# Patient Record
Sex: MALE | Race: BLACK OR AFRICAN AMERICAN | Employment: UNEMPLOYED | URBAN - METROPOLITAN AREA
[De-identification: names, ages, dates, MRNs, and addresses within clinical notes are randomized per-mention and may not be internally consistent; named-entity substitution may affect disease eponyms.]

---

## 2022-04-27 ENCOUNTER — OFFICE VISIT (OUTPATIENT)
Dept: PRIMARY CARE CLINIC | Age: 62
End: 2022-04-27
Payer: MEDICAID

## 2022-04-27 VITALS
BODY MASS INDEX: 21.82 KG/M2 | OXYGEN SATURATION: 98 % | WEIGHT: 139 LBS | DIASTOLIC BLOOD PRESSURE: 83 MMHG | SYSTOLIC BLOOD PRESSURE: 119 MMHG | HEIGHT: 67 IN | TEMPERATURE: 96.6 F | HEART RATE: 69 BPM

## 2022-04-27 DIAGNOSIS — M25.562 PAIN IN LATERAL PORTION OF LEFT KNEE: Primary | ICD-10-CM

## 2022-04-27 DIAGNOSIS — M54.32 LEFT SIDED SCIATICA: ICD-10-CM

## 2022-04-27 DIAGNOSIS — M19.131 POSTTRAUMATIC ARTHRITIS OF WRIST, RIGHT: ICD-10-CM

## 2022-04-27 DIAGNOSIS — Z00.00 ROUTINE ADULT HEALTH MAINTENANCE: ICD-10-CM

## 2022-04-27 DIAGNOSIS — R20.2 PARESTHESIA OF ARM: ICD-10-CM

## 2022-04-27 DIAGNOSIS — M19.132 POST-TRAUMATIC ARTHRITIS OF LEFT WRIST: ICD-10-CM

## 2022-04-27 PROCEDURE — 99203 OFFICE O/P NEW LOW 30 MIN: CPT | Performed by: INTERNAL MEDICINE

## 2022-04-27 SDOH — ECONOMIC STABILITY: FOOD INSECURITY: WITHIN THE PAST 12 MONTHS, YOU WORRIED THAT YOUR FOOD WOULD RUN OUT BEFORE YOU GOT MONEY TO BUY MORE.: NEVER TRUE

## 2022-04-27 SDOH — ECONOMIC STABILITY: FOOD INSECURITY: WITHIN THE PAST 12 MONTHS, THE FOOD YOU BOUGHT JUST DIDN'T LAST AND YOU DIDN'T HAVE MONEY TO GET MORE.: NEVER TRUE

## 2022-04-27 ASSESSMENT — PATIENT HEALTH QUESTIONNAIRE - PHQ9
SUM OF ALL RESPONSES TO PHQ9 QUESTIONS 1 & 2: 0
SUM OF ALL RESPONSES TO PHQ QUESTIONS 1-9: 0
SUM OF ALL RESPONSES TO PHQ QUESTIONS 1-9: 0
2. FEELING DOWN, DEPRESSED OR HOPELESS: 0
1. LITTLE INTEREST OR PLEASURE IN DOING THINGS: 0
SUM OF ALL RESPONSES TO PHQ QUESTIONS 1-9: 0
SUM OF ALL RESPONSES TO PHQ QUESTIONS 1-9: 0

## 2022-04-27 ASSESSMENT — SOCIAL DETERMINANTS OF HEALTH (SDOH): HOW HARD IS IT FOR YOU TO PAY FOR THE VERY BASICS LIKE FOOD, HOUSING, MEDICAL CARE, AND HEATING?: NOT HARD AT ALL

## 2022-04-27 NOTE — PATIENT INSTRUCTIONS
1. X rays of both wrists, neck, and left knee  2. Fasting blood work  3. Overdue for vaccinations, colonoscopy  4. Referrals to [facility] K[facility] and Ortho Spine.   5. Will probably need disability evaluation, but lets see what can be fixed first

## 2022-04-27 NOTE — PROGRESS NOTES
2022    Rashmi Villar (:  1960) is a 64 y.o. male, here for evaluation of the following medical concerns:    Chief Complaint   Patient presents with    New Patient       HPI  71-year-old healthy without records in care everywhere comes in seeking disability for multiple remote work-related injuries with seemingly progressive arthritis involving his neck low back left worse than right hands and wrists, left knee. Mr. Ollie Moy explains that he suffered a work-related injury where he broke both his arms and left leg in , suffering a fall while working in [place]. He indicates he did not have surgery but rather splinting, does not believe he has any hardware in his body. He evidently signed a release of liability as part of an out of court settlement. That though he did not have he suffered another fall apparently also on the job though is unsure when, that resulted in his low back pain. He also endorses pain radiating from his neck down to his left hand. He has been unable to work due to progressive weakness and joint pain, for the last couple of years. He evidently was hospitalized at [facility] for these orthopedic injuries, I am unable to review any orthopedic notes in the chart. He tells me that he was told that he would eventually need knee surgery, and he endorses knee pain give way weakness. He takes a large number of NSAIDs a day. He has 3 or 4 alcoholic drinks a month. He alternates between smoking a pack of cigarettes a day and a couple of cigars a day. He smokes a fair amount of marijuana couple grams a day. He does not recall having had health screening labs ever, no colonoscopy. No immunizations on record. Review of Systems   Constitutional: Negative for activity change, appetite change, fatigue and unexpected weight change. HENT: Negative for dental problem, sinus pain, sore throat and trouble swallowing.     Eyes: Negative for pain and visual disturbance. Respiratory: Negative for apnea, cough, chest tightness, shortness of breath and wheezing. Cardiovascular: Negative for chest pain and palpitations. Gastrointestinal: Negative for abdominal pain, blood in stool, constipation, diarrhea, nausea, rectal pain and vomiting. Endocrine: Negative for cold intolerance, heat intolerance, polydipsia, polyphagia and polyuria. Genitourinary: Negative for difficulty urinating, dysuria, flank pain, frequency, hematuria, pelvic pain, urgency. Musculoskeletal: Positive for arthralgias, back pain, gait problem, joint swelling, myalgias, neck pain; negative for joint swelling myalgias neck stiffness. Skin: Negative for color change and rash. Neurological: Negative for dizziness, tremors, syncope, speech difficulty, weakness, light-headedness and headaches. His left index finger has been numb since 2017. Hematological: Negative for adenopathy. Does not bruise/bleed easily. Psychiatric/Behavioral: Negative for agitation, behavioral problems, decreased concentration, sleep disturbance and suicidal ideas. The patient is not nervous/anxious and is not hyperactive. Prior to Visit Medications    Not on File        Allergies   Allergen Reactions    Penicillins        No past medical history on file. No past surgical history on file.     Social History     Socioeconomic History    Marital status:      Spouse name: Not on file    Number of children: Not on file    Years of education: Not on file    Highest education level: Not on file   Occupational History    Not on file   Tobacco Use    Smoking status: Current Every Day Smoker     Types: Cigarettes, Cigars    Smokeless tobacco: Never Used   Substance and Sexual Activity    Alcohol use: Not on file    Drug use: Not on file    Sexual activity: Not on file   Other Topics Concern    Not on file   Social History Narrative    Not on file     Social Determinants of Health     Financial Resource Strain: Low Risk     Difficulty of Paying Living Expenses: Not hard at all   Food Insecurity: No Food Insecurity    Worried About Running Out of Food in the Last Year: Never true    Latesha of Food in the Last Year: Never true   Transportation Needs:     Lack of Transportation (Medical): Not on file    Lack of Transportation (Non-Medical): Not on file   Physical Activity:     Days of Exercise per Week: Not on file    Minutes of Exercise per Session: Not on file   Stress:     Feeling of Stress : Not on file   Social Connections:     Frequency of Communication with Friends and Family: Not on file    Frequency of Social Gatherings with Friends and Family: Not on file    Attends Yazidism Services: Not on file    Active Member of [address] [place] [address] [employer] or Organizations: Not on file    Attends Club or Organization Meetings: Not on file    Marital Status: Not on file   Intimate Partner Violence:     Fear of Current or Ex-Partner: Not on file    Emotionally Abused: Not on file    Physically Abused: Not on file    Sexually Abused: Not on file   Housing Stability:     Unable to Pay for Housing in the Last Year: Not on file    Number of [place] in the Last Year: Not on file    Unstable Housing in the Last Year: Not on file        No family history on file. Vitals:    04/27/22 1443   BP: 119/83   Pulse: 69   Temp: 96.6 °F (35.9 °C)   TempSrc: Temporal   SpO2: 98%   Weight: 139 lb (63 kg)   Height: 5' 7\" (1.702 m)     Estimated body mass index is 21.77 kg/m² as calculated from the following:    Height as of this encounter: 5' 7\" (1.702 m). Weight as of this encounter: 139 lb (63 kg). PHYSICAL EXAM  GENERAL:  Pleasant  male who looks older than his stated age, awake alert and oriented x3, no acute distress. EXTREMITIES:  Warm and well perfused without clubbing cyanosis or edema. 2+ pulses in all 4 extremities. Capillary refill less than 2 seconds.   NEURO:  Cranial nerves 2-12 grossly intact. Normal muscle bulk and tone. No resting tremor, cogwheeling, normal rapid alternating movements in the hands and feet. No stocking paresthesia. Normal gait and station. Positive Tinel sign on the left  MUSCULOSKELETAL: Extensive osteoarthritic changes. PSYCH:  No psychomotor retardation or agitation. Good eye contact. Unrestricted affect range. Mood congruent with affect. Linear thought. LABS  Lab Review   No visits with results within 14 Month(s) from this visit. Latest known visit with results is:   No results found for any previous visit. ASSESSMENT/PLAN  72-year-old male with extensive work-related injuries with chronic sequelae from such, some suggestion not thoroughly examined today of neural impingement in the neck possibly back and carpal tunnel, without recent evaluation or routine health care. We agreed our goals would be to initiate this evaluation to determine the extent to which some of his physical limitations are reversible, with the expectation that he would need disability evaluation to address any remnant issues. It is my hope that his spine and knee issues can be addressed in a single orthopedic visit though this may be a challenge. 1. Pain in lateral portion of left knee    - Jazzy Edwards MD, Orthopedic Surgery, [facility]  - C-Reactive Protein; Future  - Uric Acid; Future  - RHEUMATOID FACTOR; Future  - ADAN Reflex to Antibody Cascade; Future  - Vitamin B12; Future  - XR KNEE LEFT (1-2 VIEWS); Future    2. Paresthesia of arm  May need EMG to sort out cervical radiculopathy versus more peripheral impingement syndromes. - Andres Gilbert MD, Orthopedic Surgery, [facility], Ashlyn Mulligan MD, Orthopedic Surgery, [facility]  - C-Reactive Protein; Future  - Uric Acid; Future  - RHEUMATOID FACTOR; Future  - ADAN Reflex to Antibody Cascade; Future  - Vitamin B12; Future  - XR KNEE LEFT (1-2 VIEWS);  Future  - XR CERVICAL SPINE (2-3 VIEWS); Future    3. Post-traumatic arthritis of left wrist    - Jazzy - Juan Washington MD, Orthopedic Surgery, [facility]  - XR KNEE LEFT (1-2 VIEWS); Future  - XR WRIST RIGHT (2 VIEWS); Future    4. Routine adult health maintenance  No vaccinations on record. No colonoscopy. Smoker, precontemplative. Marijuana use as well. Indicated that we would discuss lung cancer screening and colonoscopy in the future. - TSH with Reflex; Future  - HIV Screen; Future  - Hep C AB RLFX HCV PCR-A; Future  - Hemoglobin A1C; Future  - PSA, Total and Free; Future  - CBC with Auto Differential; Future  - Comprehensive Metabolic Panel; Future  - Vitamin D 25 Hydroxy; Future    5. Left sided sciatica  EMG, high-level imaging may become necessary.  - Lisbet Delaney MD, Orthopedic Surgery, [facility]  - XR KNEE LEFT (1-2 VIEWS); Future  - XR LUMBAR SPINE (2-3 VIEWS); Future    6. Posttraumatic arthritis of wrist, right    - XR WRIST LEFT (2 VIEWS); Future      Return in about 4 weeks (around 5/25/2022). It was a pleasure to visit with Mr. Angela Ramirez today. Answered all questions as best I could.   Shaina Gu MD   Time 32-minute

## 2022-05-20 ENCOUNTER — APPOINTMENT (OUTPATIENT)
Dept: GENERAL RADIOLOGY | Age: 62
End: 2022-05-20
Payer: MEDICAID

## 2022-05-20 ENCOUNTER — HOSPITAL ENCOUNTER (EMERGENCY)
Age: 62
Discharge: HOME | End: 2022-05-20
Attending: EMERGENCY MEDICINE
Payer: MEDICAID

## 2022-05-20 VITALS
TEMPERATURE: 98.5 F | HEART RATE: 77 BPM | OXYGEN SATURATION: 98 % | SYSTOLIC BLOOD PRESSURE: 134 MMHG | DIASTOLIC BLOOD PRESSURE: 86 MMHG | RESPIRATION RATE: 16 BRPM | BODY MASS INDEX: 21.56 KG/M2 | WEIGHT: 137.4 LBS | HEIGHT: 67 IN

## 2022-05-20 DIAGNOSIS — M16.11 ARTHRITIS OF RIGHT HIP: ICD-10-CM

## 2022-05-20 DIAGNOSIS — M25.551 RIGHT HIP PAIN: Primary | ICD-10-CM

## 2022-05-20 PROCEDURE — 73502 X-RAY EXAM HIP UNI 2-3 VIEWS: CPT

## 2022-05-20 PROCEDURE — 99283 EMERGENCY DEPT VISIT LOW MDM: CPT

## 2022-05-20 PROCEDURE — 6370000000 HC RX 637 (ALT 250 FOR IP): Performed by: EMERGENCY MEDICINE

## 2022-05-20 RX ORDER — IBUPROFEN 600 MG/1
600 TABLET ORAL ONCE
Status: COMPLETED | OUTPATIENT
Start: 2022-05-20 | End: 2022-05-20

## 2022-05-20 RX ORDER — NAPROXEN 500 MG/1
500 TABLET ORAL 2 TIMES DAILY WITH MEALS
Qty: 60 TABLET | Refills: 0 | Status: SHIPPED | OUTPATIENT
Start: 2022-05-20

## 2022-05-20 RX ADMIN — IBUPROFEN 600 MG: 600 TABLET ORAL at 21:55

## 2022-05-20 ASSESSMENT — ENCOUNTER SYMPTOMS
SHORTNESS OF BREATH: 0
COUGH: 0
RESPIRATORY NEGATIVE: 1
GASTROINTESTINAL NEGATIVE: 1
CHEST TIGHTNESS: 0
BACK PAIN: 0
SORE THROAT: 0
ABDOMINAL PAIN: 0
NAUSEA: 0
VOMITING: 0
DIARRHEA: 0

## 2022-05-20 ASSESSMENT — PAIN SCALES - GENERAL
PAINLEVEL_OUTOF10: 8
PAINLEVEL_OUTOF10: 4

## 2022-05-20 ASSESSMENT — PAIN DESCRIPTION - DESCRIPTORS: DESCRIPTORS: SORE

## 2022-05-20 ASSESSMENT — PAIN DESCRIPTION - ORIENTATION: ORIENTATION: RIGHT

## 2022-05-20 ASSESSMENT — PAIN DESCRIPTION - LOCATION: LOCATION: HIP

## 2022-05-20 ASSESSMENT — PAIN DESCRIPTION - PAIN TYPE: TYPE: ACUTE PAIN

## 2022-05-21 NOTE — ED PROVIDER NOTES
[id number] [facility]  [facility]      Pt Name: Anuja Francois  MRN: [id number]  Thiengfeda 1960  Date ofevaluation: 5/20/2022  Provider: Apple Martin MD    CHIEF COMPLAINT       Chief Complaint   Patient presents with    Hip Pain     right hip pain since 5/17. no known injury or trauma. no OTC pain meds. pain now at 4--up to 8 with walking. pt states he is limping a little bit now. HISTORY OF PRESENT ILLNESS   (Location/Symptom, Timing/Onset,Context/Setting, Quality, Duration, Modifying Factors, Severity)  Note limiting factors. Anuja Francois is a 64 y.o. male  who  has no past medical history on file. who presents to the emergency department     19-year-old male presents for right hip pain that has been gradually worsening for the last 3 to 4 days. No known injury or trauma. Has not taken any over-the-counter medications. Pain is worse with walking or bending. Better with immobilization or lying down. No other known risk factors. No chronic past medical history. No recent falls. No numbness or weakness. No other associated symptoms. No obvious inciting factors. Pain is aching and dull in nature nonradiating. For further review of systems see below. The history is provided by the patient. No  was used. NursingNotes were reviewed. REVIEW OF SYSTEMS    (2-9 systems for level 4, 10 or more for level 5)     Review of Systems   Constitutional: Negative. Negative for chills and fever. HENT: Negative for congestion and sore throat. Respiratory: Negative. Negative for cough, chest tightness and shortness of breath. Cardiovascular: Negative. Negative for chest pain. Gastrointestinal: Negative. Negative for abdominal pain, diarrhea, nausea and vomiting. Genitourinary: Negative. Musculoskeletal: Positive for arthralgias. Negative for back pain and joint swelling. Skin: Negative.   Negative for wound.   Neurological: Negative. Negative for dizziness, weakness, light-headedness, numbness and headaches. Hematological: Negative. Does not bruise/bleed easily. All other systems reviewed and are negative. Except as noted above the remainder of the review of systems was reviewed and negative. PAST MEDICAL HISTORY   History reviewed. No pertinent past medical history. SURGICALHISTORY     History reviewed. No pertinent surgical history. CURRENT MEDICATIONS       Previous Medications    No medications on file            Penicillins    FAMILY HISTORY     History reviewed. No pertinent family history. SOCIAL HISTORY       Social History     Socioeconomic History    Marital status:      Spouse name: None    Number of children: None    Years of education: None    Highest education level: None   Occupational History    None   Tobacco Use    Smoking status: Current Every Day Smoker     Packs/day: 0.50     Types: Cigarettes, Cigars    Smokeless tobacco: Never Used   Substance and Sexual Activity    Alcohol use: Yes     Comment: few times a year    Drug use: Yes     Types: Marijuana Norval Remak)     Comment: 5/20/22 marijuana    Sexual activity: None   Other Topics Concern    None   Social History Narrative    None     Social Determinants of Health     Financial Resource Strain: Low Risk     Difficulty of Paying Living Expenses: Not hard at all   Food Insecurity: No Food Insecurity    Worried About Running Out of Food in the Last Year: Never true    Latesha of Food in the Last Year: Never true   Transportation Needs:     Lack of Transportation (Medical): Not on file    Lack of Transportation (Non-Medical):  Not on file   Physical Activity:     Days of Exercise per Week: Not on file    Minutes of Exercise per Session: Not on file   Stress:     Feeling of Stress : Not on file   Social Connections:     Frequency of Communication with Friends and Family: Not on file    Frequency of Social Gatherings with Friends and Family: Not on file    Attends Amish Services: Not on file    Active Member of Clubs or Organizations: Not on file    Attends Club or Organization Meetings: Not on file    Marital Status: Not on file   Intimate Partner Violence:     Fear of Current or Ex-Partner: Not on file    Emotionally Abused: Not on file    Physically Abused: Not on file    Sexually Abused: Not on file   Housing Stability:     Unable to Pay for Housing in the Last Year: Not on file    Number of [place] in the Last Year: Not on file    Unstable Housing in the Last Year: Not on file       SCREENINGS    Anthony Coma Scale  Eye Opening: Spontaneous  Best Verbal Response: Oriented  Best Motor Response: Obeys commands  Anthony Coma Scale Score: 15        PHYSICAL EXAM    (up to 7 for level 4, 8 or more for level 5)     ED Triage Vitals   BP Temp Temp src Pulse Resp SpO2 Height Weight   -- -- -- -- -- -- -- --       Physical Exam  Vitals and nursing note reviewed. Constitutional:       General: He is not in acute distress. Appearance: Normal appearance. He is well-developed and normal weight. He is not ill-appearing, toxic-appearing or diaphoretic. HENT:      Head: Normocephalic and atraumatic. Mouth/Throat:      Mouth: Mucous membranes are moist.      Pharynx: Oropharynx is clear. Eyes:      Extraocular Movements: Extraocular movements intact. Cardiovascular:      Rate and Rhythm: Normal rate and regular rhythm. Pulses: Normal pulses. Pulmonary:      Effort: Pulmonary effort is normal.      Breath sounds: Normal breath sounds. No decreased breath sounds. Abdominal:      Palpations: Abdomen is soft. Tenderness: There is no abdominal tenderness. Musculoskeletal:      Cervical back: Normal range of motion and neck supple. Right hip: Tenderness present. No deformity, lacerations or bony tenderness. Normal range of motion.       Left hip: Normal. Right upper leg: Normal. No swelling. Left upper leg: Normal. No swelling. Right knee: No swelling, deformity, effusion, erythema, ecchymosis, lacerations or bony tenderness. Normal range of motion. No tenderness. Left knee: No swelling, deformity, effusion, erythema, ecchymosis, lacerations or bony tenderness. Normal range of motion. No tenderness. Right lower leg: No swelling, deformity, tenderness or bony tenderness. No edema. Left lower leg: No swelling, deformity, tenderness or bony tenderness. No edema. Right ankle: No swelling, deformity or lacerations. No tenderness. Normal range of motion. Normal pulse. Left ankle: No swelling, deformity or lacerations. No tenderness. Normal range of motion. Normal pulse. Right foot: Normal range of motion and normal capillary refill. No swelling, deformity, tenderness or bony tenderness. Normal pulse. Left foot: Normal range of motion and normal capillary refill. No swelling, deformity, tenderness or bony tenderness. Normal pulse. Comments: Mild tenderness with movement of the right hip with both flexion extension internal and external rotation with normal range of motion no obvious deformities    DP and PT pulses 2+ bilaterally   Skin:     General: Skin is warm and dry. Capillary Refill: Capillary refill takes less than 2 seconds. Neurological:      General: No focal deficit present. Mental Status: He is alert. Psychiatric:         Mood and Affect: Mood normal.         RESULTS       RADIOLOGY:   Non-plain filmimages such as CT, Ultrasound and MRI are read by the radiologist.  Interpretation per the Radiologist below, if available at the time ofthis note:    XR HIP 2-3 VW W PELVIS RIGHT   Final Result   No acute abnormality. Degenerative changes to both hips and both SI joints.                ED BEDSIDE ULTRASOUND:   Performed by ED Physician - none    LABS:  Labs Reviewed - No data to display    All other labs were within normal range or not returned as of this dictation. EMERGENCY DEPARTMENT COURSE and DIFFERENTIAL DIAGNOSIS/MDM:   Vitals:    Vitals:    05/20/22 2043   BP: 134/86   Pulse: 77   Resp: 16   Temp: 98.5 °F (36.9 °C)   TempSrc: Oral   SpO2: 98%   Weight: 137 lb 6.4 oz (62.3 kg)   Height: 5' 7\" (1.702 m)       Patient was given thefollowing medications:  Medications   ibuprofen (ADVIL;MOTRIN) tablet 600 mg (600 mg Oral Given 5/20/22 2155)       ED COURSE & MEDICAL DECISION MAKING    Pertinent Labs & Imaging studies reviewed. (See chart for details)   -  Patient seen and evaluated in the emergency department. -  Triage and nursing notes reviewed and incorporated. -  Old chart records reviewed and incorporated. -  Differential diagnosis includes: Differential diagnosis: Osteoarthritis, fracture, dislocation, grade 3 sprain, syndesmotic sprain, vascular injury, neurologic injury, tendon injury, other    59-year-old male presents for atraumatic right hip pain. Neurovascularly intact. No signs of vascular neurologic or tendon injury. No wounds. No overlying erythema or concern for infection. Exam is relatively unremarkable no swelling no deformity. Patient has not been taking any over-the-counter medication. Most likely osteo-arthritis. Low concern for rheumatologic diseases patient has only 1 affected joint. Will give patient ibuprofen as well as prescription for naproxen and instructions to follow-up with primary care provider. X-ray unremarkable      -  Work-up included:  See above  -  ED treatment included: See above  -  Results discussed with patient. REASSESSMENT      I estimate there is LOW risk for COMPARTMENT SYNDROME, DEEP VENOUS THROMBOSIS, SEPTIC ARTHRITIS, TENDON OR NEUROVASCULAR INJURY, thus I consider the discharge disposition reasonable. The patient is at low risk for mortality based on demographic, history and clinical factors.  Given the best available information and clinical assessment, I estimate the risk of hospitalization to be greater than risk of treatment at home. I have explained to the patient that the risk could rapidly change, given precautions for return and instructions. Explained to patient that the risk for mortality is low based on demographic, history and clinical factors. I discussed with patient the results of evaluation in the ED, diagnosis, care, and prognosis. The plan is to discharge to home. Patient is in agreement with plan and questions have been answered. I also discussed with patient the reasons which may require a return visit and the importance of follow-up care. The patient is well-appearing, nontoxic, and improved at the time of discharge. Patient agrees to call to arrange follow-up care as directed. Patient understands to return immediately for worsening/change in symptoms. CRITICAL CARE TIME   Total Critical Care time was 0 minutes, excluding separately reportable procedures. There was a high probability of clinically significant/life threatening deterioration in the patient's condition which required my urgent intervention. CONSULTS:  None    PROCEDURES:  Unless otherwise noted below, none     Procedures    FINAL IMPRESSION      1. Right hip pain    2.  Arthritis of right hip          DISPOSITION/PLAN   DISPOSITION        PATIENT REFERREDTO:  Jazlyn Street MD  [address] [phone]    Schedule an appointment as soon as possible for a visit         DISCHARGEMEDICATIONS:  New Prescriptions    NAPROXEN (NAPROSYN) 500 MG TABLET    Take 1 tablet by mouth 2 times daily (with meals)          (Please note that portions of this note were completed with a voice recognition program.  Efforts were made to edit the dictations but occasionally words are mis-transcribed.)    Abigail Finley MD (electronically signed)  Attending Emergency Physician         Abigail Finley MD  05/20/22 3251

## 2022-05-21 NOTE — ED NOTES
right hip pain since 5/17. no known injury or trauma. no OTC pain meds. pain now at 4--up to 8 with walking. pt states he is limping a little bit now.      Holly Thapa RN  05/20/22 8106

## 2022-06-10 NOTE — ED NOTES
Ready to go home. Discharge instructions with pt. Explained rx and diagnoses. Pain right hip at 4. Limps mildly when walking. Encouraged follow up with PCP.      Sarahi Young RN  06/10/22 [address], RN  06/10/22 5031

## 2022-09-14 ENCOUNTER — TELEPHONE (OUTPATIENT)
Dept: PRIMARY CARE CLINIC | Age: 62
End: 2022-09-14

## 2022-09-14 NOTE — TELEPHONE ENCOUNTER
----- Message from Cara Sanchez sent at 9/14/2022 12:51 PM EDT -----  Subject: Message to Provider    QUESTIONS  Information for Provider? pt requesting that his lab order paperwork be   mailed to his house. [address], [facility], [address]  ---------------------------------------------------------------------------  --------------  Corin Osman INFO  [phone]; OK to leave message on voicemail  ---------------------------------------------------------------------------  --------------  SCRIPT ANSWERS  Relationship to Patient?  Self

## 2023-10-28 ENCOUNTER — HOSPITAL ENCOUNTER (EMERGENCY)
Age: 63
Discharge: HOME | End: 2023-10-29
Attending: EMERGENCY MEDICINE
Payer: MEDICAID

## 2023-10-28 DIAGNOSIS — S01.112A LACERATION OF LEFT EYEBROW, INITIAL ENCOUNTER: Primary | ICD-10-CM

## 2023-10-28 PROCEDURE — 12013 RPR F/E/E/N/L/M 2.6-5.0 CM: CPT

## 2023-10-28 PROCEDURE — 90715 TDAP VACCINE 7 YRS/> IM: CPT | Performed by: EMERGENCY MEDICINE

## 2023-10-28 PROCEDURE — 90471 IMMUNIZATION ADMIN: CPT | Performed by: EMERGENCY MEDICINE

## 2023-10-28 PROCEDURE — 6370000000 HC RX 637 (ALT 250 FOR IP): Performed by: EMERGENCY MEDICINE

## 2023-10-28 PROCEDURE — 99284 EMERGENCY DEPT VISIT MOD MDM: CPT

## 2023-10-28 PROCEDURE — 6360000002 HC RX W HCPCS: Performed by: EMERGENCY MEDICINE

## 2023-10-28 RX ORDER — IBUPROFEN 600 MG/1
600 TABLET ORAL ONCE
Status: COMPLETED | OUTPATIENT
Start: 2023-10-28 | End: 2023-10-28

## 2023-10-28 RX ORDER — HYDROCODONE BITARTRATE AND ACETAMINOPHEN 5; 325 MG/1; MG/1
1 TABLET ORAL ONCE
Status: COMPLETED | OUTPATIENT
Start: 2023-10-28 | End: 2023-10-28

## 2023-10-28 RX ORDER — TETRACAINE HYDROCHLORIDE 5 MG/ML
1 SOLUTION OPHTHALMIC ONCE
Status: COMPLETED | OUTPATIENT
Start: 2023-10-28 | End: 2023-10-28

## 2023-10-28 RX ORDER — IBUPROFEN 600 MG/1
600 TABLET ORAL EVERY 6 HOURS PRN
Qty: 30 TABLET | Refills: 0 | Status: SHIPPED | OUTPATIENT
Start: 2023-10-28

## 2023-10-28 RX ORDER — HYDROCODONE BITARTRATE AND ACETAMINOPHEN 5; 325 MG/1; MG/1
1 TABLET ORAL EVERY 6 HOURS PRN
Qty: 12 TABLET | Refills: 0 | Status: SHIPPED | OUTPATIENT
Start: 2023-10-28 | End: 2023-10-31

## 2023-10-28 RX ADMIN — TETRACAINE HYDROCHLORIDE 1 DROP: 5 SOLUTION OPHTHALMIC at 23:13

## 2023-10-28 RX ADMIN — FLUORESCEIN SODIUM 1 MG: 1 STRIP OPHTHALMIC at 23:13

## 2023-10-28 RX ADMIN — TETANUS TOXOID, REDUCED DIPHTHERIA TOXOID AND ACELLULAR PERTUSSIS VACCINE, ADSORBED 0.5 ML: 5; 2.5; 8; 8; 2.5 SUSPENSION INTRAMUSCULAR at 23:15

## 2023-10-28 RX ADMIN — HYDROCODONE BITARTRATE AND ACETAMINOPHEN 1 TABLET: 5; 325 TABLET ORAL at 23:27

## 2023-10-28 RX ADMIN — IBUPROFEN 600 MG: 600 TABLET, FILM COATED ORAL at 23:27

## 2023-10-28 ASSESSMENT — PAIN - FUNCTIONAL ASSESSMENT
PAIN_FUNCTIONAL_ASSESSMENT: PREVENTS OR INTERFERES SOME ACTIVE ACTIVITIES AND ADLS
PAIN_FUNCTIONAL_ASSESSMENT: 0-10

## 2023-10-28 ASSESSMENT — PAIN DESCRIPTION - DESCRIPTORS: DESCRIPTORS: DISCOMFORT

## 2023-10-28 ASSESSMENT — PAIN DESCRIPTION - LOCATION: LOCATION: EYE

## 2023-10-28 ASSESSMENT — PAIN DESCRIPTION - FREQUENCY: FREQUENCY: CONTINUOUS

## 2023-10-28 ASSESSMENT — PAIN DESCRIPTION - PAIN TYPE: TYPE: ACUTE PAIN

## 2023-10-28 ASSESSMENT — PAIN SCALES - GENERAL: PAINLEVEL_OUTOF10: 9

## 2023-10-28 ASSESSMENT — PAIN DESCRIPTION - ORIENTATION: ORIENTATION: LEFT

## 2023-10-29 VITALS
SYSTOLIC BLOOD PRESSURE: 155 MMHG | RESPIRATION RATE: 16 BRPM | DIASTOLIC BLOOD PRESSURE: 95 MMHG | OXYGEN SATURATION: 97 % | HEIGHT: 67 IN | TEMPERATURE: 97.8 F | WEIGHT: 139.11 LBS | BODY MASS INDEX: 21.83 KG/M2 | HEART RATE: 78 BPM

## 2023-10-29 ASSESSMENT — ENCOUNTER SYMPTOMS
VOMITING: 0
SHORTNESS OF BREATH: 0
DIARRHEA: 0
EYE REDNESS: 0
CONSTIPATION: 0
BACK PAIN: 0
COUGH: 0
RHINORRHEA: 0
EYE PAIN: 0
ABDOMINAL PAIN: 0
NAUSEA: 0

## 2023-10-29 ASSESSMENT — PAIN - FUNCTIONAL ASSESSMENT: PAIN_FUNCTIONAL_ASSESSMENT: 0-10

## 2023-10-29 ASSESSMENT — PAIN DESCRIPTION - PAIN TYPE: TYPE: ACUTE PAIN

## 2023-10-29 ASSESSMENT — PAIN SCALES - GENERAL: PAINLEVEL_OUTOF10: 4

## 2023-10-29 ASSESSMENT — PAIN DESCRIPTION - DESCRIPTORS: DESCRIPTORS: DISCOMFORT

## 2023-10-29 ASSESSMENT — PAIN DESCRIPTION - ORIENTATION: ORIENTATION: LEFT

## 2023-10-29 ASSESSMENT — PAIN DESCRIPTION - LOCATION: LOCATION: EYE

## 2024-06-11 ENCOUNTER — APPOINTMENT (OUTPATIENT)
Dept: GENERAL RADIOLOGY | Age: 64
End: 2024-06-11
Payer: MEDICAID

## 2024-06-11 ENCOUNTER — HOSPITAL ENCOUNTER (INPATIENT)
Age: 64
LOS: 3 days | Discharge: HOME | End: 2024-06-14
Attending: HOSPITALIST | Admitting: INTERNAL MEDICINE
Payer: MEDICAID

## 2024-06-11 ENCOUNTER — APPOINTMENT (OUTPATIENT)
Dept: CT IMAGING | Age: 64
End: 2024-06-11
Payer: MEDICAID

## 2024-06-11 DIAGNOSIS — R06.02 SHORTNESS OF BREATH: ICD-10-CM

## 2024-06-11 DIAGNOSIS — I50.9 CHF (CONGESTIVE HEART FAILURE) (HCC): ICD-10-CM

## 2024-06-11 DIAGNOSIS — J18.9 PNEUMONIA OF BOTH LOWER LOBES DUE TO INFECTIOUS ORGANISM: Primary | ICD-10-CM

## 2024-06-11 PROBLEM — J96.01 ACUTE RESPIRATORY FAILURE WITH HYPOXEMIA (HCC): Status: ACTIVE | Noted: 2024-06-11

## 2024-06-11 LAB
ALBUMIN SERPL-MCNC: 3.1 G/DL (ref 3.4–5)
ALBUMIN/GLOB SERPL: 0.6 {RATIO} (ref 1.1–2.2)
ALP SERPL-CCNC: 65 U/L (ref 40–129)
ALT SERPL-CCNC: 36 U/L (ref 10–40)
ANION GAP SERPL CALCULATED.3IONS-SCNC: 10 MMOL/L (ref 3–16)
AST SERPL-CCNC: 41 U/L (ref 15–37)
BASE EXCESS BLDV CALC-SCNC: 2.2 MMOL/L
BASOPHILS # BLD: 0 K/UL (ref 0–0.2)
BASOPHILS NFR BLD: 0.5 %
BILIRUB SERPL-MCNC: 0.4 MG/DL (ref 0–1)
BUN SERPL-MCNC: 19 MG/DL (ref 7–20)
CALCIUM SERPL-MCNC: 8.7 MG/DL (ref 8.3–10.6)
CHLORIDE SERPL-SCNC: 107 MMOL/L (ref 99–110)
CO2 BLDV-SCNC: 30 MMOL/L
CO2 SERPL-SCNC: 24 MMOL/L (ref 21–32)
COHGB MFR BLDV: 2.8 %
CREAT SERPL-MCNC: 1 MG/DL (ref 0.8–1.3)
DEPRECATED RDW RBC AUTO: 14.5 % (ref 12.4–15.4)
EKG ATRIAL RATE: 90 BPM
EKG DIAGNOSIS: NORMAL
EKG P AXIS: 93 DEGREES
EKG P-R INTERVAL: 164 MS
EKG Q-T INTERVAL: 360 MS
EKG QRS DURATION: 70 MS
EKG QTC CALCULATION (BAZETT): 440 MS
EKG R AXIS: 58 DEGREES
EKG T AXIS: 94 DEGREES
EKG VENTRICULAR RATE: 90 BPM
EOSINOPHIL # BLD: 0.1 K/UL (ref 0–0.6)
EOSINOPHIL NFR BLD: 1.2 %
GFR SERPLBLD CREATININE-BSD FMLA CKD-EPI: 84 ML/MIN/{1.73_M2}
GLUCOSE SERPL-MCNC: 89 MG/DL (ref 70–99)
HCO3 BLDV-SCNC: 28 MMOL/L (ref 23–29)
HCT VFR BLD AUTO: 31.5 % (ref 40.5–52.5)
HGB BLD-MCNC: 10.7 G/DL (ref 13.5–17.5)
IRON SATN MFR SERPL: 15 % (ref 20–50)
IRON SERPL-MCNC: 27 UG/DL (ref 59–158)
LYMPHOCYTES # BLD: 1.8 K/UL (ref 1–5.1)
LYMPHOCYTES NFR BLD: 21.2 %
MCH RBC QN AUTO: 29.5 PG (ref 26–34)
MCHC RBC AUTO-ENTMCNC: 34.1 G/DL (ref 31–36)
MCV RBC AUTO: 86.7 FL (ref 80–100)
METHGB MFR BLDV: 1 %
MONOCYTES # BLD: 1 K/UL (ref 0–1.3)
MONOCYTES NFR BLD: 12.5 %
NEUTROPHILS # BLD: 5.3 K/UL (ref 1.7–7.7)
NEUTROPHILS NFR BLD: 64.6 %
NT-PROBNP SERPL-MCNC: 3044 PG/ML (ref 0–124)
O2 THERAPY: NORMAL
PCO2 BLDV: 49.6 MMHG (ref 40–50)
PH BLDV: 7.36 [PH] (ref 7.35–7.45)
PLATELET # BLD AUTO: 338 K/UL (ref 135–450)
PMV BLD AUTO: 7.1 FL (ref 5–10.5)
PO2 BLDV: <30 MMHG
POTASSIUM SERPL-SCNC: 5.1 MMOL/L (ref 3.5–5.1)
PROCALCITONIN SERPL IA-MCNC: 0.08 NG/ML (ref 0–0.15)
PROT SERPL-MCNC: 7.9 G/DL (ref 6.4–8.2)
RBC # BLD AUTO: 3.63 M/UL (ref 4.2–5.9)
SAO2 % BLDV: 30 %
SARS-COV-2 RDRP RESP QL NAA+PROBE: NOT DETECTED
SODIUM SERPL-SCNC: 141 MMOL/L (ref 136–145)
TIBC SERPL-MCNC: 182 UG/DL (ref 260–445)
TROPONIN, HIGH SENSITIVITY: 20 NG/L (ref 0–22)
TROPONIN, HIGH SENSITIVITY: 23 NG/L (ref 0–22)
WBC # BLD AUTO: 8.3 K/UL (ref 4–11)

## 2024-06-11 PROCEDURE — 83550 IRON BINDING TEST: CPT

## 2024-06-11 PROCEDURE — 6360000002 HC RX W HCPCS: Performed by: PHYSICIAN ASSISTANT

## 2024-06-11 PROCEDURE — 71045 X-RAY EXAM CHEST 1 VIEW: CPT

## 2024-06-11 PROCEDURE — 6360000002 HC RX W HCPCS: Performed by: HOSPITALIST

## 2024-06-11 PROCEDURE — 2580000003 HC RX 258

## 2024-06-11 PROCEDURE — 84145 PROCALCITONIN (PCT): CPT

## 2024-06-11 PROCEDURE — 2060000000 HC ICU INTERMEDIATE R&B

## 2024-06-11 PROCEDURE — 99223 1ST HOSP IP/OBS HIGH 75: CPT | Performed by: INTERNAL MEDICINE

## 2024-06-11 PROCEDURE — 6370000000 HC RX 637 (ALT 250 FOR IP): Performed by: PHYSICIAN ASSISTANT

## 2024-06-11 PROCEDURE — 87635 SARS-COV-2 COVID-19 AMP PRB: CPT

## 2024-06-11 PROCEDURE — 93005 ELECTROCARDIOGRAM TRACING: CPT | Performed by: EMERGENCY MEDICINE

## 2024-06-11 PROCEDURE — 99285 EMERGENCY DEPT VISIT HI MDM: CPT

## 2024-06-11 PROCEDURE — 87205 SMEAR GRAM STAIN: CPT

## 2024-06-11 PROCEDURE — 96374 THER/PROPH/DIAG INJ IV PUSH: CPT

## 2024-06-11 PROCEDURE — 82803 BLOOD GASES ANY COMBINATION: CPT

## 2024-06-11 PROCEDURE — 87449 NOS EACH ORGANISM AG IA: CPT

## 2024-06-11 PROCEDURE — 2580000003 HC RX 258: Performed by: PHYSICIAN ASSISTANT

## 2024-06-11 PROCEDURE — 2580000003 HC RX 258: Performed by: HOSPITALIST

## 2024-06-11 PROCEDURE — 83540 ASSAY OF IRON: CPT

## 2024-06-11 PROCEDURE — 93010 ELECTROCARDIOGRAM REPORT: CPT | Performed by: INTERNAL MEDICINE

## 2024-06-11 PROCEDURE — 94761 N-INVAS EAR/PLS OXIMETRY MLT: CPT

## 2024-06-11 PROCEDURE — 6370000000 HC RX 637 (ALT 250 FOR IP): Performed by: HOSPITALIST

## 2024-06-11 PROCEDURE — 36415 COLL VENOUS BLD VENIPUNCTURE: CPT

## 2024-06-11 PROCEDURE — 84484 ASSAY OF TROPONIN QUANT: CPT

## 2024-06-11 PROCEDURE — 71260 CT THORAX DX C+: CPT

## 2024-06-11 PROCEDURE — 94640 AIRWAY INHALATION TREATMENT: CPT

## 2024-06-11 PROCEDURE — 2700000000 HC OXYGEN THERAPY PER DAY

## 2024-06-11 PROCEDURE — 83880 ASSAY OF NATRIURETIC PEPTIDE: CPT

## 2024-06-11 PROCEDURE — 87633 RESP VIRUS 12-25 TARGETS: CPT

## 2024-06-11 PROCEDURE — 6360000004 HC RX CONTRAST MEDICATION: Performed by: PHYSICIAN ASSISTANT

## 2024-06-11 PROCEDURE — 87070 CULTURE OTHR SPECIMN AEROBIC: CPT

## 2024-06-11 PROCEDURE — 80053 COMPREHEN METABOLIC PANEL: CPT

## 2024-06-11 PROCEDURE — 85025 COMPLETE CBC W/AUTO DIFF WBC: CPT

## 2024-06-11 RX ORDER — WATER 10 ML/10ML
INJECTION INTRAMUSCULAR; INTRAVENOUS; SUBCUTANEOUS
Status: COMPLETED
Start: 2024-06-11 | End: 2024-06-11

## 2024-06-11 RX ORDER — LOSARTAN POTASSIUM 25 MG/1
25 TABLET ORAL DAILY
Status: DISCONTINUED | OUTPATIENT
Start: 2024-06-11 | End: 2024-06-14

## 2024-06-11 RX ORDER — SODIUM CHLORIDE 0.9 % (FLUSH) 0.9 %
5-40 SYRINGE (ML) INJECTION EVERY 12 HOURS SCHEDULED
Status: DISCONTINUED | OUTPATIENT
Start: 2024-06-11 | End: 2024-06-14 | Stop reason: HOSPADM

## 2024-06-11 RX ORDER — IPRATROPIUM BROMIDE AND ALBUTEROL SULFATE 2.5; .5 MG/3ML; MG/3ML
2 SOLUTION RESPIRATORY (INHALATION) ONCE
Status: COMPLETED | OUTPATIENT
Start: 2024-06-11 | End: 2024-06-11

## 2024-06-11 RX ORDER — METHYLPREDNISOLONE SODIUM SUCCINATE 40 MG/ML
40 INJECTION, POWDER, LYOPHILIZED, FOR SOLUTION INTRAMUSCULAR; INTRAVENOUS EVERY 8 HOURS
Status: DISCONTINUED | OUTPATIENT
Start: 2024-06-11 | End: 2024-06-12

## 2024-06-11 RX ORDER — FUROSEMIDE 10 MG/ML
40 INJECTION INTRAMUSCULAR; INTRAVENOUS 2 TIMES DAILY
Status: DISCONTINUED | OUTPATIENT
Start: 2024-06-11 | End: 2024-06-13

## 2024-06-11 RX ORDER — NICOTINE 21 MG/24HR
1 PATCH, TRANSDERMAL 24 HOURS TRANSDERMAL DAILY
Status: DISCONTINUED | OUTPATIENT
Start: 2024-06-11 | End: 2024-06-14 | Stop reason: HOSPADM

## 2024-06-11 RX ORDER — ACETAMINOPHEN 325 MG/1
650 TABLET ORAL EVERY 6 HOURS PRN
Status: DISCONTINUED | OUTPATIENT
Start: 2024-06-11 | End: 2024-06-14 | Stop reason: HOSPADM

## 2024-06-11 RX ORDER — POTASSIUM CHLORIDE 7.45 MG/ML
10 INJECTION INTRAVENOUS PRN
Status: DISCONTINUED | OUTPATIENT
Start: 2024-06-11 | End: 2024-06-14 | Stop reason: HOSPADM

## 2024-06-11 RX ORDER — HYDRALAZINE HYDROCHLORIDE 20 MG/ML
10 INJECTION INTRAMUSCULAR; INTRAVENOUS EVERY 4 HOURS PRN
Status: DISCONTINUED | OUTPATIENT
Start: 2024-06-11 | End: 2024-06-14 | Stop reason: HOSPADM

## 2024-06-11 RX ORDER — ONDANSETRON 2 MG/ML
4 INJECTION INTRAMUSCULAR; INTRAVENOUS EVERY 6 HOURS PRN
Status: DISCONTINUED | OUTPATIENT
Start: 2024-06-11 | End: 2024-06-14 | Stop reason: HOSPADM

## 2024-06-11 RX ORDER — POTASSIUM CHLORIDE 20 MEQ/1
40 TABLET, EXTENDED RELEASE ORAL PRN
Status: DISCONTINUED | OUTPATIENT
Start: 2024-06-11 | End: 2024-06-14 | Stop reason: HOSPADM

## 2024-06-11 RX ORDER — ONDANSETRON 4 MG/1
4 TABLET, ORALLY DISINTEGRATING ORAL EVERY 8 HOURS PRN
Status: DISCONTINUED | OUTPATIENT
Start: 2024-06-11 | End: 2024-06-14 | Stop reason: HOSPADM

## 2024-06-11 RX ORDER — POLYETHYLENE GLYCOL 3350 17 G/17G
17 POWDER, FOR SOLUTION ORAL DAILY PRN
Status: DISCONTINUED | OUTPATIENT
Start: 2024-06-11 | End: 2024-06-14 | Stop reason: HOSPADM

## 2024-06-11 RX ORDER — SODIUM CHLORIDE 9 MG/ML
INJECTION, SOLUTION INTRAVENOUS PRN
Status: DISCONTINUED | OUTPATIENT
Start: 2024-06-11 | End: 2024-06-14 | Stop reason: HOSPADM

## 2024-06-11 RX ORDER — ACETAMINOPHEN 650 MG/1
650 SUPPOSITORY RECTAL EVERY 6 HOURS PRN
Status: DISCONTINUED | OUTPATIENT
Start: 2024-06-11 | End: 2024-06-14 | Stop reason: HOSPADM

## 2024-06-11 RX ORDER — FUROSEMIDE 10 MG/ML
40 INJECTION INTRAMUSCULAR; INTRAVENOUS ONCE
Status: COMPLETED | OUTPATIENT
Start: 2024-06-11 | End: 2024-06-11

## 2024-06-11 RX ORDER — METHYLPREDNISOLONE SODIUM SUCCINATE 125 MG/2ML
125 INJECTION, POWDER, LYOPHILIZED, FOR SOLUTION INTRAMUSCULAR; INTRAVENOUS ONCE
Status: COMPLETED | OUTPATIENT
Start: 2024-06-11 | End: 2024-06-11

## 2024-06-11 RX ORDER — MAGNESIUM SULFATE IN WATER 40 MG/ML
2000 INJECTION, SOLUTION INTRAVENOUS PRN
Status: DISCONTINUED | OUTPATIENT
Start: 2024-06-11 | End: 2024-06-14 | Stop reason: HOSPADM

## 2024-06-11 RX ORDER — CARVEDILOL 25 MG/1
25 TABLET ORAL 2 TIMES DAILY WITH MEALS
Status: DISCONTINUED | OUTPATIENT
Start: 2024-06-11 | End: 2024-06-14

## 2024-06-11 RX ORDER — IPRATROPIUM BROMIDE AND ALBUTEROL SULFATE 2.5; .5 MG/3ML; MG/3ML
1 SOLUTION RESPIRATORY (INHALATION)
Status: DISCONTINUED | OUTPATIENT
Start: 2024-06-11 | End: 2024-06-12

## 2024-06-11 RX ORDER — SODIUM CHLORIDE 0.9 % (FLUSH) 0.9 %
5-40 SYRINGE (ML) INJECTION PRN
Status: DISCONTINUED | OUTPATIENT
Start: 2024-06-11 | End: 2024-06-14 | Stop reason: HOSPADM

## 2024-06-11 RX ORDER — ENOXAPARIN SODIUM 100 MG/ML
40 INJECTION SUBCUTANEOUS DAILY
Status: DISCONTINUED | OUTPATIENT
Start: 2024-06-11 | End: 2024-06-14 | Stop reason: HOSPADM

## 2024-06-11 RX ADMIN — WATER 1000 MG: 1 INJECTION INTRAMUSCULAR; INTRAVENOUS; SUBCUTANEOUS at 14:50

## 2024-06-11 RX ADMIN — AZITHROMYCIN MONOHYDRATE 500 MG: 500 INJECTION, POWDER, LYOPHILIZED, FOR SOLUTION INTRAVENOUS at 14:51

## 2024-06-11 RX ADMIN — Medication 10 ML: at 20:30

## 2024-06-11 RX ADMIN — FUROSEMIDE 40 MG: 10 INJECTION, SOLUTION INTRAMUSCULAR; INTRAVENOUS at 18:55

## 2024-06-11 RX ADMIN — METHYLPREDNISOLONE SODIUM SUCCINATE 125 MG: 125 INJECTION INTRAMUSCULAR; INTRAVENOUS at 08:44

## 2024-06-11 RX ADMIN — WATER 10 ML: 1 INJECTION INTRAMUSCULAR; INTRAVENOUS; SUBCUTANEOUS at 16:25

## 2024-06-11 RX ADMIN — FUROSEMIDE 40 MG: 10 INJECTION, SOLUTION INTRAMUSCULAR; INTRAVENOUS at 14:44

## 2024-06-11 RX ADMIN — METHYLPREDNISOLONE SODIUM SUCCINATE 40 MG: 40 INJECTION INTRAMUSCULAR; INTRAVENOUS at 16:25

## 2024-06-11 RX ADMIN — IPRATROPIUM BROMIDE AND ALBUTEROL SULFATE 1 DOSE: .5; 3 SOLUTION RESPIRATORY (INHALATION) at 20:30

## 2024-06-11 RX ADMIN — IOPAMIDOL 75 ML: 755 INJECTION, SOLUTION INTRAVENOUS at 10:35

## 2024-06-11 RX ADMIN — IPRATROPIUM BROMIDE AND ALBUTEROL SULFATE 2 DOSE: 2.5; .5 SOLUTION RESPIRATORY (INHALATION) at 08:37

## 2024-06-11 RX ADMIN — LOSARTAN POTASSIUM 25 MG: 25 TABLET, FILM COATED ORAL at 16:25

## 2024-06-11 RX ADMIN — CARVEDILOL 25 MG: 25 TABLET, FILM COATED ORAL at 16:25

## 2024-06-11 ASSESSMENT — LIFESTYLE VARIABLES
HOW OFTEN DO YOU HAVE A DRINK CONTAINING ALCOHOL: NEVER
HOW MANY STANDARD DRINKS CONTAINING ALCOHOL DO YOU HAVE ON A TYPICAL DAY: PATIENT DOES NOT DRINK

## 2024-06-11 ASSESSMENT — PAIN SCALES - GENERAL: PAINLEVEL_OUTOF10: 0

## 2024-06-11 ASSESSMENT — ENCOUNTER SYMPTOMS
COUGH: 0
EYE PAIN: 0
ABDOMINAL PAIN: 0
SORE THROAT: 0
SHORTNESS OF BREATH: 1
NAUSEA: 0
BACK PAIN: 0
VOMITING: 0

## 2024-06-11 NOTE — PROGRESS NOTES
4 Eyes Skin Assessment     NAME:  Tate Adams  YOB: 1960  MEDICAL RECORD NUMBER:  [id number]    The patient is being assessed for  Admission    I agree that at least one RN has performed a thorough Head to Toe Skin Assessment on the patient. ALL assessment sites listed below have been assessed.      Areas assessed by both nurses:    Head, Face, Ears, Shoulders, Back, Chest, Arms, Elbows, Hands, Sacrum. Buttock, Coccyx, Ischium, Legs. Feet and Heels, and Under Medical Devices         Does the Patient have a Wound? No noted wound(s)       Berto Prevention initiated by RN: No  Wound Care Orders initiated by RN: No    Pressure Injury (Stage 3,4, Unstageable, DTI, NWPT, and Complex wounds) if present, place Wound referral order by RN under : No    New Ostomies, if present place, Ostomy referral order under : No     Nurse 1 eSignature: Electronically signed by Kelly Carrasco RN on 6/11/24 at 6:03 PM EDT    **SHARE this note so that the co-signing nurse can place an eSignature**    Nurse 2 eSignature: {Esignature:[id number]}

## 2024-06-11 NOTE — ED PROVIDER NOTES
**ADVANCED PRACTICE PROVIDER, I HAVE EVALUATED THIS PATIENT**        [facility] EMERGENCY DEPARTMENT  EMERGENCY DEPARTMENT ENCOUNTER      Pt Name: Tate Adams  MRN:[id number]  Birthdate 1960  Date of evaluation: 6/11/2024  Provider: Robe Roland PA-C  Note Started: 8:41 AM EDT 6/11/24        Chief Complaint:    Chief Complaint   Patient presents with    Shortness of Breath     Pt states SOB started 2-3 days ago and has ankle swelling bilateral          Nursing Notes, Past Medical Hx, Past Surgical Hx, Social Hx, Allergies, and Family Hx were all reviewed and agreed with or any disagreements were addressed in the HPI.    HPI: (Location, Duration, Timing, Severity, Quality, Assoc Sx, Context, Modifying factors)    History From: Patient  Limitations to history : None    Social Determinants Significantly Affecting Health : None    Chief Complaint of shortness of breath.  Please complaint of shortness of breath for the last 2 days.  Appears to be slightly anxious as well.  He denies fever.  No chest pain, no abdominal pain.  No nausea vomiting.  No history of CHF, no history hypertension or diabetes.  Also complaining of ankle swelling.  He has a slight cough.  No other complaints.  He was given DuoNeb's x 1 by EMS prior to arrival here in the ED.    This is a  63 y.o. male who presents to the emergency room with the above complaint.    PastMedical/Surgical History:  No past medical history on file.  No past surgical history on file.    Medications:  Previous Medications    No medications on file       Review of Systems:  (1 systems needed)  Review of Systems   Constitutional:  Negative for chills and fever.   HENT:  Negative for congestion and sore throat.    Eyes:  Negative for pain and visual disturbance.   Respiratory:  Positive for shortness of breath. Negative for cough.    Cardiovascular:  Negative for chest pain and leg swelling.   Gastrointestinal:  Negative for abdominal pain,  0.08.    Chest x-ray shows diffusely increased interstitial markings throughout the lungs bilaterally which may be due to atypical pneumonia.  Chronic interstitial lung disease cannot be excluded as there are no prior imaging.  Patchy opacity at the medial right lower lung which may reflect airspace disease.  See results above    CT chest with contrast show as above.    Did start patient on Rocephin and Zithromax IV here in the ED.  I discussed his results from today with him.  And discussed admitting him for pneumonia and shortness of breath.  He is okay with this plan.  I will place a consult out to hospitalist service to [facility] for admission.  He has improved after the breathing treatment.    I did talk to Dr. Gonzáles.  And he says he put an order for echo.  And he is okay with starting the patient on Lasix.      Disposition Considerations (Tests not ordered but considered, Shared Decision Making, Pt Expectation of Test or Tx.):     See discussion above.          The patient tolerated their visit well.  I evaluated the patient.  The physician was available for consultation as needed.  The patient and / or the family were informed of the results of any tests, a time was given to answer questions, a plan was proposed and they agreed with plan.     I am the Primary Clinician of Record.     CLINICAL IMPRESSION:  1. Pneumonia of both lower lobes due to infectious organism    2. Shortness of breath        DISPOSITION Decision To Admit 06/11/2024 01:02:03 PM      PATIENT REFERRED TO:  No follow-up provider specified.    DISCHARGE MEDICATIONS:  New Prescriptions    No medications on file       DISCONTINUED MEDICATIONS:  Discontinued Medications    IBUPROFEN (ADVIL;MOTRIN) 600 MG TABLET    Take 1 tablet by mouth every 6 hours as needed for Pain              (Please note the MDM and HPI sections of this note were completed with a voice recognition program.  Efforts were made to edit the dictations but occasionally

## 2024-06-11 NOTE — CARE COORDINATION
Case Management Assessment  Initial Evaluation    Date/Time of Evaluation: 6/11/2024 5:16 PM  Assessment Completed by: TRISTON Mccartney    If patient is discharged prior to next notation, then this note serves as note for discharge by case management.    Patient Name: Tate Adams                   YOB: 1960  Diagnosis: Shortness of breath [R06.02]  Acute respiratory failure with hypoxemia (HCC) [J96.01]  Pneumonia of both lower lobes due to infectious organism [J18.9]                   Date / Time: 6/11/2024  8:14 AM    Patient Admission Status: Inpatient   Readmission Risk (Low < 19, Mod (19-27), High > 27): Readmission Risk Score: 9    Current PCP: No primary care provider on file.  PCP verified by CM?      Chart Reviewed: Yes      History Provided by: Patient  Patient Orientation: Alert and Oriented    Patient Cognition: Alert    Hospitalization in the last 30 days (Readmission):  No    If yes, Readmission Assessment in CM Navigator will be completed.    Advance Directives:      Code Status: Full Code   Patient's Primary Decision Maker is: Legal Next of Kin    Primary Decision Maker: Erin Kline - Brother/Sister - [phone]    Discharge Planning:    Patient lives with: Friends Type of Home: House  Primary Care Giver: Self  Patient Support Systems include: Family Members   Current Financial resources: Medicaid  Current community resources: None  Current services prior to admission: None            Current DME:              Type of Home Care services:  None    ADLS  Prior functional level: Independent in ADLs/IADLs  Current functional level: Independent in ADLs/IADLs    PT AM-PAC:   /24  OT AM-PAC:   /24    Family can provide assistance at DC: Yes  Would you like Case Management to discuss the discharge plan with any other family members/significant others, and if so, who? No  Plans to Return to Present Housing: Yes  Other Identified Issues/Barriers to RETURNING to current housing:

## 2024-06-11 NOTE — ED NOTES
Pt arrived to the ED via [facility] pt states that he has been SOB for 2-3 days with bilateral ankle swelling, +2 pitting edema to both ankles, pt desat to 85% on room air, pt placed on 5 liters of nasal cannula at this time

## 2024-06-11 NOTE — CONSULTS
.REASON FOR CONSULTATION/CC: shortness of breath       Consult at request of No att. providers found for shortness of breath     PCP: No primary care provider on file.  Established Pulmonologist:  None    HISTORY OF PRESENT ILLNESS: Tate Adams is a 63 y.o. year old male with a history of  who presents with     Patient endorses increased shortness of breath cough and phlegm.  This originally started out as orange with phlegm that was associated with abdominal bloating and lower extremity edema.  Patient states this changed over to green phlegm.  Unclear whether the patient had fevers.      This note may have been  transcribed using Dragon Dictation software. Please disregard any translational errors.      Assessment:          Plan:      Hospital Day 0     Abnormal radiograph  Pneumomediastinum  This is demonstrated on the left.  This is a very small.  No intervention needed for this.  Is unlikely to progress.  This is likely secondary to coughing.  Discussed with emergency room physician  Significant emphysema demonstrated with hx tobacco and cocaine use.    Check alpha 1.    Mediastinal hilar adenopathy noted with reticulonodular opacities.    Agree with overall concern for fluid overload versus pneumonia.  Elevated BNP.  Check echocardiogram.  Check procalcitonin today and for the next 3 days.  Agree with empiric antibiotics.    Pulmonary nodules  Seen on CT chest.  Unclear significance.      Left eye laceration  Appears to still have stitches.  Since it appears these were placed in October 2023.  Therefore, these need to be removed.             This note was transcribed using Dragon Dictation software. Please disregard any translational errors.    Thank you for the consult    MISTI MATHIS   [facility]  [phone]             Data:     PAST MEDICAL HISTORY:  No past medical history on file.    PAST SURGICAL HISTORY:  No past surgical history on file.    FAMILY  pneumomediastinum on the left near the  pulmonary hilum.    Lungs/pleura: Secretions in the trachea and right mainstem bronchus.  More  diffuse pattern of peribronchial thickening.  There are trace bilateral  pleural effusions.  Diffuse interlobular septal thickening with  reticulonodular airspace opacities throughout both lungs.  More focal pattern  of peripheral airspace opacification in the right greater than left lower  lobe.    Upper Abdomen: The adrenal glands are not included in the field of view of  this study.    Soft Tissues/Bones: No skeletal abnormalities are appreciated within the  chest.    Impression  1. Extensive pattern of interstitial change including peribronchial  thickening, reticulonodular opacities as well as peripheral airspace  opacities in both lower lobes.  Pattern is nonspecific but may represent an  atypical or viral pattern of pneumonia.  Pulmonary edema may also be  considered.  2. Mediastinal and bilateral hilar lymphadenopathy is likely reactive.  3. Trace pneumomediastinum on the left.  Based upon appearance and location,  this is likely spontaneous relating to altered intrathoracic pressure  dissecting from bronchovascular sheath in the left hilum.      CT Chest w/o contrast: No results found for this or any previous visit.      CTPA: No results found for this or any previous visit.      CXR PA/LAT: No results found for this or any previous visit.      CXR portable: Results for orders placed during the hospital encounter of 06/11/24    XR CHEST PORTABLE    Narrative  EXAMINATION:  ONE XRAY VIEW OF THE CHEST    6/11/2024 8:28 am    COMPARISON:  None.    HISTORY:  ORDERING SYSTEM PROVIDED HISTORY: short  TECHNOLOGIST PROVIDED HISTORY:  Reason for exam:->short  Reason for Exam: Shortness of Breath    FINDINGS:  HEART/MEDIASTINUM: The cardiomediastinal silhouette is within normal limits.    PLEURA/LUNGS: There are diffusely increased interstitial markings throughout  the lungs

## 2024-06-11 NOTE — PROGRESS NOTES
Pharmacy Medication Reconciliation Note     List of medications patient is currently taking is complete.    Source of information:   1. Conversation with patient   2. EMR    Notes regarding home medications:   Patient denies regularly taking any Rx/OTC/herbal home medications. Denies taking any meds today before presenting to the ER.        Angélica Navarro PharmD  6/11/2024 11:32 AM

## 2024-06-11 NOTE — H&P
Hospital Medicine History & Physical      PCP: No primary care provider on file.    Date of Admission: 6/11/2024    Date of Service: Pt seen/examined on 6/11/24 and Admitted to Inpatient with expected LOS greater than two midnights due to medical therapy.     Chief Complaint: Shortness of breath, leg swelling, cough      History Of Present Illness:      63 y.o. male with tobacco use disorder and no known medical history presented emergency room with progressive shortness of breath, leg swelling and productive cough...  The patient started having cough productive of yellow/orange sputum 7 days ago..  Over the past 2 days he is experienced progressive shortness of breath and bilateral ankle swelling..  Shortness of breath is worse with exertion, no orthopnea..  No fevers, chills or rigors.  No chest pain  He smokes 1.5 packs/day, drinks alcohol occasionally, denies illicit drug use..  He does not have a  PCP and is not on any medications  In the ED, BP was 206/100, pulse 84, respirations 36, temperature 98.5  He was hypoxic with oxygen saturation down to 85% on room air and placed on 5 L nasal cannula  BNP was 3000  CT chest showed  1. Extensive pattern of interstitial change including peribronchial  thickening, reticulonodular opacities as well as peripheral airspace  opacities in both lower lobes.  Pattern is nonspecific but may represent an  atypical or viral pattern of pneumonia.  Pulmonary edema may also be  considered.  2. Mediastinal and bilateral hilar lymphadenopathy is likely reactive.  3. Trace pneumomediastinum on the left.  Based upon appearance and location,  this is likely spontaneous relating to altered intrathoracic pressure  dissecting from bronchovascular sheath in the left hilum.           Past Medical History:      No significant medical history    Past Surgical History:      No past surgical history on file.    Medications Prior to Admission:      None    Allergies:  Penicillins    Social  pneumonia, pulmonary edema  -Emphysema/COPD   -Trace pneumomediastinum on the left    -Suspected acute decompensated heart failure..  Small bilateral pleural effusion, suspected pulmonary edema, ankle swelling, elevated BNP  -Suspected pneumonia due to unspecified organism..  Procalcitonin is normal  -COPD/emphysema with possible acute exacerbation  -Tobacco use disorder--smokes 1.5 packs/day  -Hypertensive urgency/emergency..  SBP to 106/100 and presentation  -Normocytic anemia, unknown chronicity    PLAN:  Continue supplemental oxygen  2D echo  IV Lasix, fluid restriction  Empiric antibiotics-IV Rocephin and azithromycin  Continue bronchodilators and Solu-Medrol  Pneumonia panel and sputum cultures  Repeat chest x-ray in a.m. to monitor pneumomediastinum  Pulmonology consulted  Smoking cessation counseling, provide nicotine patches  BP control--continue IV Lasix, add losartan and Coreg    DVT Prophylaxis: Lovenox  Diet: ADULT DIET; Regular; No Added Salt (3-4 gm); 1800 ml  Code Status: Full Code           Orion Perry MD    Thank you No primary care provider on file. for the opportunity to be involved in this patient's care. If you have any questions or concerns please feel free to contact me at [phone].    Comment: Please note this report has been produced using speech recognition software and may contain errors related to that system including errors in grammar, punctuation, and spelling, as well as words and phrases that may be inappropriate. If there are any questions or concerns, please feel free to contact the dictating provider for clarification.

## 2024-06-12 ENCOUNTER — APPOINTMENT (OUTPATIENT)
Dept: GENERAL RADIOLOGY | Age: 64
End: 2024-06-12
Payer: MEDICAID

## 2024-06-12 LAB
ANION GAP SERPL CALCULATED.3IONS-SCNC: 10 MMOL/L (ref 3–16)
BUN SERPL-MCNC: 35 MG/DL (ref 7–20)
CALCIUM SERPL-MCNC: 8.5 MG/DL (ref 8.3–10.6)
CHLORIDE SERPL-SCNC: 109 MMOL/L (ref 99–110)
CHOLEST SERPL-MCNC: 114 MG/DL (ref 0–199)
CO2 SERPL-SCNC: 22 MMOL/L (ref 21–32)
CREAT SERPL-MCNC: 1.5 MG/DL (ref 0.8–1.3)
GFR SERPLBLD CREATININE-BSD FMLA CKD-EPI: 52 ML/MIN/{1.73_M2}
GLUCOSE SERPL-MCNC: 124 MG/DL (ref 70–99)
HDLC SERPL-MCNC: 25 MG/DL (ref 40–60)
LDLC SERPL CALC-MCNC: 71 MG/DL
LEGIONELLA AG UR QL: NORMAL
MAGNESIUM SERPL-MCNC: 2.3 MG/DL (ref 1.8–2.4)
POTASSIUM SERPL-SCNC: 4.8 MMOL/L (ref 3.5–5.1)
PROCALCITONIN SERPL IA-MCNC: 0.18 NG/ML (ref 0–0.15)
REPORT: NORMAL
RESP PATH DNA+RNA PNL L RESP NAA+NON-PRB: NORMAL
S PNEUM AG UR QL: NORMAL
SODIUM SERPL-SCNC: 141 MMOL/L (ref 136–145)
TRIGL SERPL-MCNC: 91 MG/DL (ref 0–150)
TSH SERPL DL<=0.005 MIU/L-ACNC: 0.66 UIU/ML (ref 0.27–4.2)
VLDLC SERPL CALC-MCNC: 18 MG/DL

## 2024-06-12 PROCEDURE — 99233 SBSQ HOSP IP/OBS HIGH 50: CPT | Performed by: INTERNAL MEDICINE

## 2024-06-12 PROCEDURE — 80048 BASIC METABOLIC PNL TOTAL CA: CPT

## 2024-06-12 PROCEDURE — 6370000000 HC RX 637 (ALT 250 FOR IP): Performed by: HOSPITALIST

## 2024-06-12 PROCEDURE — 80061 LIPID PANEL: CPT

## 2024-06-12 PROCEDURE — 84443 ASSAY THYROID STIM HORMONE: CPT

## 2024-06-12 PROCEDURE — 94761 N-INVAS EAR/PLS OXIMETRY MLT: CPT

## 2024-06-12 PROCEDURE — 2060000000 HC ICU INTERMEDIATE R&B

## 2024-06-12 PROCEDURE — 2580000003 HC RX 258

## 2024-06-12 PROCEDURE — 2580000003 HC RX 258: Performed by: HOSPITALIST

## 2024-06-12 PROCEDURE — 82104 ALPHA-1-ANTITRYPSIN PHENO: CPT

## 2024-06-12 PROCEDURE — 84145 PROCALCITONIN (PCT): CPT

## 2024-06-12 PROCEDURE — 82103 ALPHA-1-ANTITRYPSIN TOTAL: CPT

## 2024-06-12 PROCEDURE — 6360000002 HC RX W HCPCS: Performed by: HOSPITALIST

## 2024-06-12 PROCEDURE — 83735 ASSAY OF MAGNESIUM: CPT

## 2024-06-12 PROCEDURE — 94640 AIRWAY INHALATION TREATMENT: CPT

## 2024-06-12 PROCEDURE — 71045 X-RAY EXAM CHEST 1 VIEW: CPT

## 2024-06-12 PROCEDURE — 2700000000 HC OXYGEN THERAPY PER DAY

## 2024-06-12 PROCEDURE — 6370000000 HC RX 637 (ALT 250 FOR IP): Performed by: INTERNAL MEDICINE

## 2024-06-12 PROCEDURE — 36415 COLL VENOUS BLD VENIPUNCTURE: CPT

## 2024-06-12 RX ORDER — PREDNISONE 20 MG/1
20 TABLET ORAL DAILY
Status: DISCONTINUED | OUTPATIENT
Start: 2024-06-12 | End: 2024-06-14

## 2024-06-12 RX ORDER — IPRATROPIUM BROMIDE AND ALBUTEROL SULFATE 2.5; .5 MG/3ML; MG/3ML
1 SOLUTION RESPIRATORY (INHALATION) EVERY 4 HOURS PRN
Status: DISCONTINUED | OUTPATIENT
Start: 2024-06-12 | End: 2024-06-14 | Stop reason: HOSPADM

## 2024-06-12 RX ORDER — WATER 10 ML/10ML
INJECTION INTRAMUSCULAR; INTRAVENOUS; SUBCUTANEOUS
Status: COMPLETED
Start: 2024-06-12 | End: 2024-06-12

## 2024-06-12 RX ADMIN — METHYLPREDNISOLONE SODIUM SUCCINATE 40 MG: 40 INJECTION INTRAMUSCULAR; INTRAVENOUS at 01:25

## 2024-06-12 RX ADMIN — IPRATROPIUM BROMIDE AND ALBUTEROL SULFATE 1 DOSE: .5; 3 SOLUTION RESPIRATORY (INHALATION) at 08:11

## 2024-06-12 RX ADMIN — SODIUM CHLORIDE, PRESERVATIVE FREE 10 ML: 5 INJECTION INTRAVENOUS at 01:25

## 2024-06-12 RX ADMIN — CARVEDILOL 25 MG: 25 TABLET, FILM COATED ORAL at 18:46

## 2024-06-12 RX ADMIN — CARVEDILOL 25 MG: 25 TABLET, FILM COATED ORAL at 09:07

## 2024-06-12 RX ADMIN — FUROSEMIDE 40 MG: 10 INJECTION, SOLUTION INTRAMUSCULAR; INTRAVENOUS at 09:07

## 2024-06-12 RX ADMIN — SODIUM CHLORIDE, PRESERVATIVE FREE 10 ML: 5 INJECTION INTRAVENOUS at 19:54

## 2024-06-12 RX ADMIN — LOSARTAN POTASSIUM 25 MG: 25 TABLET, FILM COATED ORAL at 09:07

## 2024-06-12 RX ADMIN — AZITHROMYCIN MONOHYDRATE 500 MG: 500 INJECTION, POWDER, LYOPHILIZED, FOR SOLUTION INTRAVENOUS at 15:03

## 2024-06-12 RX ADMIN — WATER 1000 MG: 1 INJECTION INTRAMUSCULAR; INTRAVENOUS; SUBCUTANEOUS at 09:07

## 2024-06-12 RX ADMIN — PREDNISONE 20 MG: 20 TABLET ORAL at 09:07

## 2024-06-12 RX ADMIN — WATER 10 ML: 1 INJECTION INTRAMUSCULAR; INTRAVENOUS; SUBCUTANEOUS at 01:30

## 2024-06-12 ASSESSMENT — PAIN SCALES - GENERAL
PAINLEVEL_OUTOF10: 0
PAINLEVEL_OUTOF10: 0

## 2024-06-12 NOTE — PLAN OF CARE
Problem: Discharge Planning  Goal: Discharge to home or other facility with appropriate resources  Outcome: Progressing  Flowsheets  Taken 6/12/2024 0157 by Italia Joseph, RN  Discharge to home or other facility with appropriate resources:   Identify barriers to discharge with patient and caregiver   Arrange for needed discharge resources and transportation as appropriate   Identify discharge learning needs (meds, wound care, etc)   Refer to discharge planning if patient needs post-hospital services based on physician order or complex needs related to functional status, cognitive ability or social support system  Taken 6/11/2024 1543 by Kelly Carrasco RN  Discharge to home or other facility with appropriate resources: Identify barriers to discharge with patient and caregiver     Problem: Pain  Goal: Verbalizes/displays adequate comfort level or baseline comfort level  Outcome: Progressing  Flowsheets (Taken 6/12/2024 0157)  Verbalizes/displays adequate comfort level or baseline comfort level:   Encourage patient to monitor pain and request assistance   Assess pain using appropriate pain scale   Administer analgesics based on type and severity of pain and evaluate response   Implement non-pharmacological measures as appropriate and evaluate response     Problem: Safety - Adult  Goal: Free from fall injury  Outcome: Progressing  Flowsheets (Taken 6/12/2024 0157)  Free From Fall Injury: Instruct family/caregiver on patient safety     Problem: ABCDS Injury Assessment  Goal: Absence of physical injury  Outcome: Progressing  Flowsheets (Taken 6/12/2024 0157)  Absence of Physical Injury: Implement safety measures based on patient assessment

## 2024-06-12 NOTE — PROGRESS NOTES
Component Value Date/Time    CHOL 114 06/12/2024 04:15 AM    HDL 25 06/12/2024 04:15 AM    TRIG 91 06/12/2024 04:15 AM     Hemoglobin A1C: No results found for: \"LABA1C\"  TSH:   Lab Results   Component Value Date/Time    TSH 0.66 06/12/2024 04:15 AM     Troponin: No results found for: \"TROPONINT\"  Lactic Acid: No results for input(s): \"LACTA\" in the last 72 hours.  BNP:   Recent Labs     06/11/24  0832   PROBNP 3,044*     UA:  No results found for: \"NITRU\", \"COLORU\", \"PHUR\", \"LABCAST\", \"WBCUA\", \"RBCUA\", \"MUCUS\", \"TRICHOMONAS\", \"YEAST\", \"BACTERIA\", \"CLARITYU\", \"SPECGRAV\", \"LEUKOCYTESUR\", \"UROBILINOGEN\", \"BILIRUBINUR\", \"BLOODU\", \"GLUCOSEU\", \"KETUA\", \"AMORPHOUS\"  Urine Cultures: No results found for: \"LABURIN\"  Blood Cultures: No results found for: \"BC\"  No results found for: \"BLOODCULT2\"  Organism: No results found for: \"ORG\"      Assessment and Recommendations   Tate Adams is a 63 y.o. male who presents with shortness of breath    -Acute respiratory failure hypoxia-oxygen saturation 85% on room air, required up to 5 L nasal cannula--weaned to room air  -Abnormal imaging CT to  -Bilateral lung opacities..  Possible pneumonia, pulmonary edema  -Emphysema/COPD   -Trace pneumomediastinum on the left     -Suspected acute decompensated heart failure..  Small bilateral pleural effusion, suspected pulmonary edema, ankle swelling, elevated BNP  -Suspected pneumonia due to unspecified organism..  Procalcitonin is normal, pneumonia panel negative  -COPD/emphysema with possible acute exacerbation  -Tobacco use disorder--smokes 1.5 packs/day  -Hypertensive urgency/emergency..  SBP to 106/100 and presentation  -Normocytic anemia, unknown chronicity  -Acute kidney injury-due to hemodynamic changes plus diuretics/ARB     PLAN:    2D echo   Hold IV Lasix and losartan given ARNALDO.  Closely monitor renal function  Empiric antibiotics-IV Rocephin and azithromycin  Continue bronchodilators and steroids  Repeat chest x-ray 6/12 with  no significant changes however pneumomediastinum noted  Pulmonology consult appreciated  Smoking cessation counseling, provide nicotine patches  BP control--continue Coreg..  Holding losartan plus Lasix due to ARNALDO  Closely monitor renal function avoid Keflex medications      Diet ADULT DIET; Regular; No Added Salt (3-4 gm); 1800 ml     DVT Prophylaxis [] Lovenox, []  Heparin, [] SCDs, [] Ambulation,  [] Eliquis, [] Xarelto  [] Coumadin   Code Status Full Code   Disposition From: Home  Expected Disposition: Home  Estimated Date of Discharge:   Patient requires continued admission due to    Surrogate Decision Maker/ POA       Personally reviewed Lab Studies and Imaging        Medical Decision Making:  The following items were considered in medical decision making:  Discussion of patient care with other providers  Reviewed clinical lab tests  Reviewed radiology tests  Reviewed other diagnostic tests/interventions  Independent review of radiologic images  Microbiology cultures and other micro tests reviewed        Electronically signed by Orion Perry MD on 6/12/2024 at 9:34 AM  Comment: Please note this report has been produced using speech recognition software and may contain errors related to that system including errors in grammar, punctuation, and spelling, as well as words and phrases that may be inappropriate. If there are any questions or concerns, please feel free to contact the dictating provider for clarification.

## 2024-06-12 NOTE — DISCHARGE INSTRUCTIONS
Extra Heart Failure Education/ Tools/ Resources:     https://[employer].com/publication/?i=741538   --- this is American Heart Association interactive Healthier Living with Heart Failure guidebook.  Please click hyperlink or copy / paste link into search bar. The QR Code is also available below. Use your mouse to scroll through the pages.  Lots of information about weight monitoring, diet tips, activity, meds, etc    Heart Failure Tools and Resources QR Code is below. It includes multiple resources to include symptom tracker, med tracker, further HF info, and access to a HF Support Network online Community    HF [place] Maria Del Rosario  -- this is a free smart phone maria del rosario available for iPhone and Android download.  Use your phone to track sodium / fluid intake, zone tool symptom tracking, weights, medications, etc. Click on this hyperlink  HF [place] Maria Del Rosario   for QR code for easy download or the link is also found in the below HF Tools and Resources.      DASH (Dietary Approach to Stop Hypertension) diet --  https://www.nhlbi.nih.gov/education/dash-eating-plan -- this diet is a flexible eating plan that promotes heart healthy eating style.  Click on hyperlink or copy / paste link into search bar.  Lots of low sodium recipes and tips.    https://www.[employer]FetchBack/recipes  -- more free recipes

## 2024-06-12 NOTE — PROGRESS NOTES
Pulmonary Progress Note    Date of Admission: 6/11/2024   LOS: 1 day       CC:  Chief Complaint   Patient presents with    Shortness of Breath     Pt states SOB started 2-3 days ago and has ankle swelling bilateral         Subjective:  Feeling much better.   Less wheezing  On and off 1 -2 L NC         Assessment:          Plan:     This note may have been transcribed using Dragon Dictation software. Please disregard any translational errors.       Hospital Day: 1     Abnormal radiograph  Pneumomediastinum  This is demonstrated on the left.  This is a very small.  No intervention needed for this.  Is unlikely to progress.  This is likely secondary to coughing.  No follow up imaging needed.   Significant emphysema demonstrated with hx tobacco and cocaine use.    pending alpha 1.    Duoneb's    Solumedrol - change to prednisone with quick improvement   Mediastinal hilar adenopathy noted with reticulonodular opacities.    Agree with overall concern for fluid overload versus pneumonia.  Elevated BNP.    pending echocardiogram.    Check procalcitonin 0.08 now 0.18.  increase may be secondary to increased Cr. Follow again tomorrow.  Agree with empiric antibiotics.     Pulmonary nodules  Seen on CT chest.  Unclear significance.  Follow up in 3 months.         Left eye laceration               Data:        PHYSICAL EXAM:   Blood pressure (!) 157/94, pulse 71, temperature 97.8 °F (36.6 °C), temperature source Oral, resp. rate 17, height 1.676 m (5' 6\"), weight 70.3 kg (154 lb 15.7 oz), SpO2 96 %.'  Body mass index is 25.01 kg/m².   Gen: No distress.    ENT:   Resp: No accessory muscle use. No crackles. No wheezes. No rhonchi.    CV: Regular rate. Regular rhythm. No murmur or rub. No edema.   Skin: Warm, dry, normal texture and turgor. No nodule on exposed extremities.   M/S: No cyanosis. No clubbing. No joint deformity.  Psych: Oriented x 3. No anxiety.  Awake. Alert. Intact judgement and insight. Good Mood / Affect.  Memory  an  atypical or viral pattern of pneumonia.  Pulmonary edema may also be  considered.  2. Mediastinal and bilateral hilar lymphadenopathy is likely reactive.  3. Trace pneumomediastinum on the left.  Based upon appearance and location,  this is likely spontaneous relating to altered intrathoracic pressure  dissecting from bronchovascular sheath in the left hilum.      CT Chest w/o contrast: No results found for this or any previous visit.      CTPA: No results found for this or any previous visit.      CXR PA/LAT: No results found for this or any previous visit.      CXR portable: Results for orders placed during the hospital encounter of 06/11/24    XR CHEST PORTABLE    Narrative  EXAMINATION:  ONE XRAY VIEW OF THE CHEST    6/12/2024 5:11 am    COMPARISON:  Chest x-ray and chest CT dated 06/11/2024.    HISTORY:  ORDERING SYSTEM PROVIDED HISTORY: pneumomediastinum,chf,  TECHNOLOGIST PROVIDED HISTORY:  Reason for exam:->pneumomediastinum,chf,  Reason for Exam: pneumomediastinum,chf,    FINDINGS:  HEART/MEDIASTINUM: The cardiomediastinal silhouette is unchanged.  Pneumomediastinum is not appreciated on this radiograph.    PLEURA/LUNGS: No change in diffuse increased interstitial markings  bilaterally reflecting pulmonary vascular congestion/interstitial edema  versus an atypical pneumonia.  Trace bilateral pleural effusions..  There is  no appreciable pneumothorax.    BONES/SOFT TISSUE: The visualized osseous structures are unchanged.    Impression  1. Pneumomediastinum is not appreciated on this radiograph.  2. No change in diffuse increased interstitial markings bilaterally  reflecting pulmonary vascular congestion/interstitial edema versus an  atypical pneumonia.             This note was transcribed using Dragon Dictation software. Please disregard any translational errors.      MISTI MATHIS   [facility] Pulmonary, Sleep and Critical Care  [phone]

## 2024-06-12 NOTE — FLOWSHEET NOTE
Oxygen saturation dipping below 90% during sleep for several seconds now. Arouses to name without difficulty. Oxygen at 1 liter applied at this time. Respirations easy. No signs of distress noted.Italia Joseph RN

## 2024-06-12 NOTE — PROGRESS NOTES
Upon assessment, patient has sutures in L eye scar. Patient reports having a cut on eye. Chart review showed laceration 10/28/23 with 10 nylon sutures placed. Patient was instructed to have follow up 1 week after but never completed. [facility] called by this RN, no patient file on record. [facility] doctor returned call and cleared for suture removal and if not done during this admission, patient can be seen outpatient for removal in office.     Electronically signed by Evelyn Joseph RN on 6/12/2024 at 11:15 AM

## 2024-06-13 ENCOUNTER — APPOINTMENT (OUTPATIENT)
Age: 64
End: 2024-06-13
Attending: INTERNAL MEDICINE
Payer: MEDICAID

## 2024-06-13 PROBLEM — I50.41 ACUTE COMBINED SYSTOLIC AND DIASTOLIC HEART FAILURE (HCC): Status: ACTIVE | Noted: 2024-06-13

## 2024-06-13 PROBLEM — J18.9 PNEUMONIA OF BOTH LOWER LOBES DUE TO INFECTIOUS ORGANISM: Status: ACTIVE | Noted: 2024-06-13

## 2024-06-13 LAB
ANION GAP SERPL CALCULATED.3IONS-SCNC: 11 MMOL/L (ref 3–16)
BUN SERPL-MCNC: 30 MG/DL (ref 7–20)
CALCIUM SERPL-MCNC: 8 MG/DL (ref 8.3–10.6)
CHLORIDE SERPL-SCNC: 107 MMOL/L (ref 99–110)
CO2 SERPL-SCNC: 23 MMOL/L (ref 21–32)
CREAT SERPL-MCNC: 1 MG/DL (ref 0.8–1.3)
ECHO AO ASC DIAM: 3.3 CM
ECHO AO ASCENDING AORTA INDEX: 1.84 CM/M2
ECHO AO ROOT DIAM: 3.3 CM
ECHO AO ROOT INDEX: 1.84 CM/M2
ECHO AV AREA PEAK VELOCITY: 2.4 CM2
ECHO AV AREA VTI: 2.6 CM2
ECHO AV AREA/BSA PEAK VELOCITY: 1.3 CM2/M2
ECHO AV AREA/BSA VTI: 1.5 CM2/M2
ECHO AV MEAN GRADIENT: 2 MMHG
ECHO AV MEAN VELOCITY: 0.7 M/S
ECHO AV PEAK GRADIENT: 4 MMHG
ECHO AV PEAK VELOCITY: 1 M/S
ECHO AV VELOCITY RATIO: 0.7
ECHO AV VTI: 21.7 CM
ECHO BSA: 1.8 M2
ECHO EST RA PRESSURE: 15 MMHG
ECHO IVC EXP: 2.5 CM
ECHO IVC INSP: 2 CM
ECHO LA AREA 2C: 26.1 CM2
ECHO LA AREA 4C: 26.1 CM2
ECHO LA MAJOR AXIS: 6 CM
ECHO LA MINOR AXIS: 6.5 CM
ECHO LA VOL BP: 87 ML (ref 18–58)
ECHO LA VOL MOD A2C: 85 ML (ref 18–58)
ECHO LA VOL MOD A4C: 83 ML (ref 18–58)
ECHO LA VOL/BSA BIPLANE: 49 ML/M2 (ref 16–34)
ECHO LA VOLUME INDEX MOD A2C: 47 ML/M2 (ref 16–34)
ECHO LA VOLUME INDEX MOD A4C: 46 ML/M2 (ref 16–34)
ECHO LV E' LATERAL VELOCITY: 5 CM/S
ECHO LV E' SEPTAL VELOCITY: 7 CM/S
ECHO LV FRACTIONAL SHORTENING: 18 % (ref 28–44)
ECHO LV INTERNAL DIMENSION DIASTOLE INDEX: 2.74 CM/M2
ECHO LV INTERNAL DIMENSION DIASTOLIC: 4.9 CM (ref 4.2–5.9)
ECHO LV INTERNAL DIMENSION SYSTOLIC INDEX: 2.23 CM/M2
ECHO LV INTERNAL DIMENSION SYSTOLIC: 4 CM
ECHO LV IVSD: 1.2 CM (ref 0.6–1)
ECHO LV MASS 2D: 213.3 G (ref 88–224)
ECHO LV MASS INDEX 2D: 119.1 G/M2 (ref 49–115)
ECHO LV POSTERIOR WALL DIASTOLIC: 1.1 CM (ref 0.6–1)
ECHO LV RELATIVE WALL THICKNESS RATIO: 0.45
ECHO LVOT AREA: 3.8 CM2
ECHO LVOT AV VTI INDEX: 0.66
ECHO LVOT DIAM: 2.2 CM
ECHO LVOT MEAN GRADIENT: 1 MMHG
ECHO LVOT PEAK GRADIENT: 2 MMHG
ECHO LVOT PEAK VELOCITY: 0.7 M/S
ECHO LVOT STROKE VOLUME INDEX: 30.4 ML/M2
ECHO LVOT SV: 54.3 ML
ECHO LVOT VTI: 14.3 CM
ECHO MV A VELOCITY: 0.85 M/S
ECHO MV E DECELERATION TIME (DT): 201 MS
ECHO MV E VELOCITY: 0.76 M/S
ECHO MV E/A RATIO: 0.89
ECHO MV E/E' LATERAL: 15.2
ECHO MV E/E' RATIO (AVERAGED): 13.03
ECHO MV E/E' SEPTAL: 10.86
ECHO PV MAX VELOCITY: 0.8 M/S
ECHO PV PEAK GRADIENT: 3 MMHG
ECHO RA AREA 4C: 20 CM2
ECHO RA END SYSTOLIC VOLUME APICAL 4 CHAMBER INDEX BSA: 36 ML/M2
ECHO RA VOLUME: 64 ML
ECHO RIGHT VENTRICULAR SYSTOLIC PRESSURE (RVSP): 58 MMHG
ECHO RV BASAL DIMENSION: 3.9 CM
ECHO RV FREE WALL PEAK S': 14 CM/S
ECHO RV LONGITUDINAL DIMENSION: 8.3 CM
ECHO RV MID DIMENSION: 3.1 CM
ECHO RV TAPSE: 3.4 CM (ref 1.7–?)
ECHO TV REGURGITANT MAX VELOCITY: 3.28 M/S
ECHO TV REGURGITANT PEAK GRADIENT: 43 MMHG
GFR SERPLBLD CREATININE-BSD FMLA CKD-EPI: 84 ML/MIN/{1.73_M2}
GLUCOSE SERPL-MCNC: 95 MG/DL (ref 70–99)
MAGNESIUM SERPL-MCNC: 2.1 MG/DL (ref 1.8–2.4)
POTASSIUM SERPL-SCNC: 3.6 MMOL/L (ref 3.5–5.1)
PROCALCITONIN SERPL IA-MCNC: 0.1 NG/ML (ref 0–0.15)
SODIUM SERPL-SCNC: 141 MMOL/L (ref 136–145)

## 2024-06-13 PROCEDURE — 6370000000 HC RX 637 (ALT 250 FOR IP): Performed by: HOSPITALIST

## 2024-06-13 PROCEDURE — 6370000000 HC RX 637 (ALT 250 FOR IP): Performed by: INTERNAL MEDICINE

## 2024-06-13 PROCEDURE — 94761 N-INVAS EAR/PLS OXIMETRY MLT: CPT

## 2024-06-13 PROCEDURE — 94760 N-INVAS EAR/PLS OXIMETRY 1: CPT

## 2024-06-13 PROCEDURE — 93306 TTE W/DOPPLER COMPLETE: CPT

## 2024-06-13 PROCEDURE — 93306 TTE W/DOPPLER COMPLETE: CPT | Performed by: INTERNAL MEDICINE

## 2024-06-13 PROCEDURE — 80048 BASIC METABOLIC PNL TOTAL CA: CPT

## 2024-06-13 PROCEDURE — 2580000003 HC RX 258: Performed by: HOSPITALIST

## 2024-06-13 PROCEDURE — 99223 1ST HOSP IP/OBS HIGH 75: CPT | Performed by: INTERNAL MEDICINE

## 2024-06-13 PROCEDURE — 94640 AIRWAY INHALATION TREATMENT: CPT

## 2024-06-13 PROCEDURE — 83735 ASSAY OF MAGNESIUM: CPT

## 2024-06-13 PROCEDURE — 36415 COLL VENOUS BLD VENIPUNCTURE: CPT

## 2024-06-13 PROCEDURE — 99233 SBSQ HOSP IP/OBS HIGH 50: CPT | Performed by: INTERNAL MEDICINE

## 2024-06-13 PROCEDURE — 6360000002 HC RX W HCPCS: Performed by: HOSPITALIST

## 2024-06-13 PROCEDURE — 76376 3D RENDER W/INTRP POSTPROCES: CPT | Performed by: INTERNAL MEDICINE

## 2024-06-13 PROCEDURE — 84145 PROCALCITONIN (PCT): CPT

## 2024-06-13 PROCEDURE — 2060000000 HC ICU INTERMEDIATE R&B

## 2024-06-13 PROCEDURE — 2700000000 HC OXYGEN THERAPY PER DAY

## 2024-06-13 RX ORDER — SPIRONOLACTONE 25 MG/1
25 TABLET ORAL DAILY
Status: DISCONTINUED | OUTPATIENT
Start: 2024-06-13 | End: 2024-06-14 | Stop reason: HOSPADM

## 2024-06-13 RX ORDER — LISINOPRIL 10 MG/1
10 TABLET ORAL DAILY
Status: DISCONTINUED | OUTPATIENT
Start: 2024-06-14 | End: 2024-06-14

## 2024-06-13 RX ORDER — FUROSEMIDE 10 MG/ML
40 INJECTION INTRAMUSCULAR; INTRAVENOUS 2 TIMES DAILY
Status: DISCONTINUED | OUTPATIENT
Start: 2024-06-13 | End: 2024-06-14

## 2024-06-13 RX ORDER — LISINOPRIL 20 MG/1
20 TABLET ORAL DAILY
Status: DISCONTINUED | OUTPATIENT
Start: 2024-06-13 | End: 2024-06-13

## 2024-06-13 RX ADMIN — HYDRALAZINE HYDROCHLORIDE 10 MG: 20 INJECTION INTRAMUSCULAR; INTRAVENOUS at 09:00

## 2024-06-13 RX ADMIN — SODIUM CHLORIDE, PRESERVATIVE FREE 10 ML: 5 INJECTION INTRAVENOUS at 21:53

## 2024-06-13 RX ADMIN — EMPAGLIFLOZIN 10 MG: 10 TABLET, FILM COATED ORAL at 15:17

## 2024-06-13 RX ADMIN — SPIRONOLACTONE 25 MG: 25 TABLET ORAL at 15:17

## 2024-06-13 RX ADMIN — FUROSEMIDE 40 MG: 10 INJECTION, SOLUTION INTRAMUSCULAR; INTRAVENOUS at 17:15

## 2024-06-13 RX ADMIN — FUROSEMIDE 40 MG: 10 INJECTION, SOLUTION INTRAMUSCULAR; INTRAVENOUS at 10:58

## 2024-06-13 RX ADMIN — AZITHROMYCIN MONOHYDRATE 500 MG: 500 INJECTION, POWDER, LYOPHILIZED, FOR SOLUTION INTRAVENOUS at 14:00

## 2024-06-13 RX ADMIN — IPRATROPIUM BROMIDE AND ALBUTEROL SULFATE 1 DOSE: .5; 3 SOLUTION RESPIRATORY (INHALATION) at 10:12

## 2024-06-13 RX ADMIN — SODIUM CHLORIDE, PRESERVATIVE FREE 10 ML: 5 INJECTION INTRAVENOUS at 08:57

## 2024-06-13 RX ADMIN — CARVEDILOL 25 MG: 25 TABLET, FILM COATED ORAL at 17:15

## 2024-06-13 RX ADMIN — PREDNISONE 20 MG: 20 TABLET ORAL at 08:53

## 2024-06-13 RX ADMIN — WATER 1000 MG: 1 INJECTION INTRAMUSCULAR; INTRAVENOUS; SUBCUTANEOUS at 08:57

## 2024-06-13 RX ADMIN — CARVEDILOL 25 MG: 25 TABLET, FILM COATED ORAL at 08:53

## 2024-06-13 RX ADMIN — LISINOPRIL 20 MG: 20 TABLET ORAL at 15:17

## 2024-06-13 NOTE — CARE COORDINATION
Discharge Planning:    Per chart review, patient's ECHO results are pending.     At this time, patient's plan is to return to prior setting with prior services. Patient has denied dc needs. CM to continue to follow for updates. Will follow for possible home oxygen needs.     Electronically signed by IBRAHIMA HUGHES RN on 6/13/2024 at 10:35 AM

## 2024-06-13 NOTE — PROGRESS NOTES
Patient called this RN complaining of SOB, this RN to bedside, patient on cont. O2 and stating 94% but patient at bedside in tripod position and increase RR. All other vital signs were stable. This RN placed patient on 1L of O2 for comfort and educated about proper breathing techniques - after about 1-2 minutes patient calmed down, O2 99% on 1L.     RT called and asked for PRN duoneb for SOB. Patient resting in bed at this time.    Electronically signed by Glen Ramos RN on 6/13/2024 at 10:02 AM

## 2024-06-13 NOTE — PROGRESS NOTES
V2.0    [facility] Progress Note      Name:  Tate Adams /Age/Sex: 1960  (63 y.o. male)   MRN & CSN:  [id number] & [id number] Encounter Date/Time: 2024 8:57 AM EDT   Location:  [id number] PCP: No primary care provider on file.     Attending:Orion Perry MD       Hospital Day: 3      HPI :   Chief Complaint:     Tate Adams is a 63 y.o. male who presents with cough and shortness of breath      Subjective:     Patient was feeling well at the time of my visit.  He had no shortness of breath and was on room air.  He apparently had an episode of agitation was down at echo, he also reported shortness of breath and was placed back on oxygen    Review of Systems:      Pertinent positives and negatives discussed in HPI    Objective:     Intake/Output Summary (Last 24 hours) at 2024 1307  Last data filed at 2024 1238  Gross per 24 hour   Intake 960 ml   Output 3300 ml   Net -2340 ml      Vitals:   Vitals:    24 0947 24 1012 24 1115 24 1230   BP: (!) 155/89  (!) 159/99    Pulse: 81 91 84 73   Resp: 20 26 27 19   Temp: 97.9 °F (36.6 °C)  97.6 °F (36.4 °C)    TempSrc: Oral  Oral    SpO2: 98% 99% 100% 96%   Weight:       Height:             Physical Exam:      Physical Exam Performed:    BP (!) 159/99   Pulse 73   Temp 97.6 °F (36.4 °C) (Oral)   Resp 19   Ht 1.676 m (5' 6\")   Wt 69.9 kg (154 lb)   SpO2 96%   BMI 24.86 kg/m²     General appearance: No apparent distress, appears stated age and cooperative.  HEENT: Pupils equal, round, and reactive to light. Conjunctivae/corneas clear.  Neck: Supple, with full range of motion. No jugular venous distention. Trachea midline.  Respiratory:  Normal respiratory effort. Clear to auscultation, bilaterally without Rales/Wheezes/Rhonchi.  Cardiovascular: Regular rate and rhythm with normal S1/S2 without murmurs, rubs or gallops.  Abdomen: Soft, non-tender, non-distended with normal bowel sounds.  Musculoskeletal: No  with pulmonary edema given rapid improvement with diuretics.  Procalcitonin is normal x2, pneumonia panel negative..  On empiric antibiotics  -COPD/emphysema with possible acute exacerbation..  CT showed significant emphysematous changes..  Continue bronchodilators and steroids.  -Tobacco use disorder--smokes 1.5 packs/day-smoking cessation recommended  -Hypertensive urgency/emergency..  SBP to 106/100 and presentation..  Continue to optimize BP regimen  -Normocytic anemia, unknown chronicity  -Acute kidney injury-due to hemodynamic changes plus diuretics/ARB--creatinine increased to 1.5 from 1.0 on 6/12--resolved..  Losartan and Lasix were held 6/12--resume Lasix  -History of left eye laceration repaired in October/23 with nylon sutures that are still present--patient did not follow-up as advised to remove the stitches-will follow-up with [facility] at discharge  -           Diet ADULT DIET; Regular; No Added Salt (3-4 gm); 1800 ml     DVT Prophylaxis [x] Lovenox, []  Heparin, [] SCDs, [] Ambulation,  [] Eliquis, [] Xarelto  [] Coumadin   Code Status Full Code   Disposition From: Home  Expected Disposition: Home  Estimated Date of Discharge:   Patient requires continued admission due to    Surrogate Decision Maker/ POA       Personally reviewed Lab Studies and Imaging        Medical Decision Making:  The following items were considered in medical decision making:  Discussion of patient care with other providers  Reviewed clinical lab tests  Reviewed radiology tests  Reviewed other diagnostic tests/interventions  Independent review of radiologic images  Microbiology cultures and other micro tests reviewed        Electronically signed by Orion Perry MD on 6/13/2024 at 1:07 PM  Comment: Please note this report has been produced using speech recognition software and may contain errors related to that system including errors in grammar, punctuation, and spelling, as well as words and phrases that may  be inappropriate. If there are any questions or concerns, please feel free to contact the dictating provider for clarification.

## 2024-06-13 NOTE — PROGRESS NOTES
Patient came down for an echo. The patient was rude to the tech scanning. When he was finished with exam the patient was put back in transport. After 10 minutes of waiting patient attempted to get off the stretcher. Patient stated,\" I am walking myself back to my room.\" Staff attempted to explain to patient about safety risk. One tech attempted to assist patient back onto stretcher and patient smacked his hand away. Due to the aggression from the patient security was informed along with transport. Patient unhooked his heart monitor and walked out the doors. He was then met by security and escort to his room on the main elevators. RN aware of the situation and heart monitor returned to floor. Safe care was filled out on the situation.

## 2024-06-13 NOTE — CONSULTS
HF RN consulted, chart reviewed. This is a new diagnosis for pt. I waited until Cardiologist saw pt so MD could explain results of echo and explain the medications he was going to being putting pt on. Upon entering room, I introduced myself and my role in his care. I sat next to pt in chair who was resting in bed and explained I wanted to review with him on things that he will need to do so as to help prevent further HF exacerbations and to help understand his new dx. He responded \"are you going to tell me what these pills are going to actually do to me and not just want they want you to tell me.\" I said I wasn't understanding what he was asking. \"You can give me a pill that I know nothing about and it could make my leg fall off.\" I explained that is not the case and if he would give me the opportunity I'd go over each of his newly prescribed meds and review possible side effects. I showed him the med page in the HF Education book which the material is also printed for his review. \"Only God can turn that television into a dela cruz pumpkin, not you or I.\" I stated with certainty, I know I could not do that.     He then asked me to explain to him why he has done fine for 63 years. When I tried to speak he interrupted and said \"I don't need this hospital or these doctors or these pills.\" I actively listened to pt. He told me I was doing things the \"white way and not the right way.\" I asked him if it would be ok if I leave the HF education booklet in his room so he can review if chooses to do so. He said yes. I circled Jazzy Jones's phone number in book as well as our HF Resource line number and told him if any questions arise to please feel free to reach out. I said if he preferred, I can have him talk with other HF RN to which he said \"I thank you for caring enough about me to try and tell me what they want you to tell me.\" He said I did a good job and then politely told me to have a good afternoon. I exited room, and I

## 2024-06-13 NOTE — PROGRESS NOTES
Patient doing well today, went down for ECHO without complications but patient was brought up from ECHO with security. In talking to patient he states he had the test completed and was waiting on transport - patient did not want his breakfast to get cold so he walked to his room. Tele was placed back on patient. Took all morning medications without complications. Patient calm and cooperative at this time. Will continue with POC.     Electronically signed by Glen Ramos RN on 6/13/2024 at 9:13 AM

## 2024-06-13 NOTE — CONSULTS
[facility]  Cardiology Consult Note        CC:      Shortness of breath             HPI:   This is a 63 y.o. -American gentleman who is a heavy smoker but has never been sick is like came to the hospital with shortness of breath and respiratory difficulties.  He was found to be in congestive heart failure with a x-ray showing venous congestion and proBNP greater than 3000.  He denies any chest pain to me.  Troponins are negative.  The patient has been treated with IV Lasix with good diuresis.  The breathing has improved.  An echocardiogram shows an EF of 40 to 45%.  EKG showed no ischemic ST changes       Social History     Tobacco Use    Smoking status: Every Day     Current packs/day: 0.50     Types: Cigarettes, Cigars    Smokeless tobacco: Never   Substance Use Topics    Alcohol use: Yes     Comment: few times a year    Drug use: Yes     Types: Marijuana (Weed)     Comment: 5/20/22 marijuana      Allergies   Allergen Reactions    Penicillins Hives      furosemide  40 mg IntraVENous BID    predniSONE  20 mg Oral Daily    sodium chloride flush  5-40 mL IntraVENous 2 times per day    enoxaparin  40 mg SubCUTAneous Daily    nicotine  1 patch TransDERmal Daily    cefTRIAXone (ROCEPHIN) IV  1,000 mg IntraVENous Q24H    azithromycin  500 mg IntraVENous Q24H    [Held by provider] losartan  25 mg Oral Daily    carvedilol  25 mg Oral BID with meals       Review of Systems -   Constitutional: Negative for weight gain/loss; malaise, fever  Respiratory: Negative for Asthma;  cough and hemoptysis  Cardiovascular: Negative for palpitations,dizziness   Gastrointestinal: Negative for abd.pain; constipation/diarrhea;    Genitourinary: Negative for stones; hematuria; frequency hesitancy  Integumentt: Negative for rash or pruritis  Hematologic/lymphatic: Negative for blood dyscrasia; leukemia/lymphoma  Musculoskeletal: Negative for Connective tissue disease  Neurological:  Negative for Seizure    Behavioral/Psych:Negative for Bipolar disorder, Schizophrenia; Dementia  Endocrine: negative for thyroid, parathyroid disease      Intake/Output Summary (Last 24 hours) at 6/13/2024 1457  Last data filed at 6/13/2024 1403  Gross per 24 hour   Intake 720 ml   Output 3725 ml   Net -3005 ml       Physical Examination:    BP (!) 159/99   Pulse 73   Temp 97.6 °F (36.4 °C) (Oral)   Resp 19   Ht 1.676 m (5' 6\")   Wt 69.9 kg (154 lb)   SpO2 96%   BMI 24.86 kg/m²    HEENT:  Face: Atraumatic, Conjunctiva: Pink; non icteric,  Mucous Memb:  Moist, No thyromegaly or Lymphadenopathy  Respiratory:  Resp Assessment: normal, Resp Auscultation: clear   Cardiovascular:  Auscultation: nl S1 & S2, Palpation:  Nl PMI; No heaves or thrills, JVP:  normal  Abdomen: Soft, non-tender, Normal bowel sounds,  No organomegaly  Extremities: No Cyanosis or Clubbing; Edema none  Neurological: Oriented to time, place, and person, Non-anxious  Psychiatric: Normal mood and affect  Skin: Warm and dry,  No rash seen      Current Facility-Administered Medications: furosemide (LASIX) injection 40 mg, 40 mg, IntraVENous, BID  ipratropium 0.5 mg-albuterol 2.5 mg (DUONEB) nebulizer solution 1 Dose, 1 Dose, Inhalation, Q4H PRN  predniSONE (DELTASONE) tablet 20 mg, 20 mg, Oral, Daily  hydrALAZINE (APRESOLINE) injection 10 mg, 10 mg, IntraVENous, Q4H PRN  sodium chloride flush 0.9 % injection 5-40 mL, 5-40 mL, IntraVENous, 2 times per day  sodium chloride flush 0.9 % injection 5-40 mL, 5-40 mL, IntraVENous, PRN  0.9 % sodium chloride infusion, , IntraVENous, PRN  ondansetron (ZOFRAN-ODT) disintegrating tablet 4 mg, 4 mg, Oral, Q8H PRN **OR** ondansetron (ZOFRAN) injection 4 mg, 4 mg, IntraVENous, Q6H PRN  polyethylene glycol (GLYCOLAX) packet 17 g, 17 g, Oral, Daily PRN  acetaminophen (TYLENOL) tablet 650 mg, 650 mg, Oral, Q6H PRN **OR** acetaminophen (TYLENOL) suppository 650 mg, 650 mg, Rectal, Q6H PRN  enoxaparin (LOVENOX) injection 40 mg, 40 mg,  SubCUTAneous, Daily  potassium chloride (KLOR-CON M) extended release tablet 40 mEq, 40 mEq, Oral, PRN **OR** potassium bicarb-citric acid (EFFER-K) effervescent tablet 40 mEq, 40 mEq, Oral, PRN **OR** potassium chloride 10 mEq/100 mL IVPB (Peripheral Line), 10 mEq, IntraVENous, PRN  magnesium sulfate 2000 mg in 50 mL IVPB premix, 2,000 mg, IntraVENous, PRN  perflutren lipid microspheres (DEFINITY) injection 1.5 mL, 1.5 mL, IntraVENous, ONCE PRN  nicotine (NICODERM CQ) 21 MG/24HR 1 patch, 1 patch, TransDERmal, Daily  cefTRIAXone (ROCEPHIN) 1,000 mg in sterile water 10 mL IV syringe, 1,000 mg, IntraVENous, Q24H  azithromycin (ZITHROMAX) 500 mg in 250 mL addavial, 500 mg, IntraVENous, Q24H  [Held by provider] losartan (COZAAR) tablet 25 mg, 25 mg, Oral, Daily  carvedilol (COREG) tablet 25 mg, 25 mg, Oral, BID with meals      Labs:   Recent Labs     06/11/24  0832   WBC 8.3   HGB 10.7*   HCT 31.5*        Recent Labs     06/12/24  0415 06/13/24  0732    141   K 4.8 3.6   CO2 22 23   BUN 35* 30*   CREATININE 1.5* 1.0   GLUCOSE 124* 95     No results for input(s): \"BNP\" in the last 72 hours.  No results for input(s): \"PROTIME\", \"INR\" in the last 72 hours.  No results for input(s): \"APTT\" in the last 72 hours.  No results for input(s): \"CKTOTAL\", \"CKMB\", \"CKMBINDEX\", \"TROPONINI\" in the last 72 hours.  Lab Results   Component Value Date/Time    HDL 25 06/12/2024 04:15 AM    TRIG 91 06/12/2024 04:15 AM     Recent Labs     06/11/24  0832   AST 41*   ALT 36         EKG:   NSR    Chest X-Ray:  VAscular congesion    ECHO: 6/13/2024  Normal LV size wall thickness with global hypokinesis and EF of 40-45%.  Grade 2 diastolic dysfunction  Left Atrium: Left atrium is severely dilated. Left atrial volume index is severely increased (>48 mL/m2).      ASSESSMENT AND PLAN:      Acute onset of shortness of breath  Patient had congestive heart failure  X-ray evidence of CHF, elevated proBNP and echo showing EF of 40 to 45%

## 2024-06-13 NOTE — PLAN OF CARE
Problem: Pain  Goal: Verbalizes/displays adequate comfort level or baseline comfort level  Outcome: Progressing     Problem: Safety - Adult  Goal: Free from fall injury  Outcome: Progressing     Problem: Respiratory - Adult  Goal: Achieves optimal ventilation and oxygenation  Outcome: Progressing  Flowsheets (Taken 6/13/2024 0001)  Achieves optimal ventilation and oxygenation:   Assess for changes in mentation and behavior   Assess for changes in respiratory status     Problem: Cardiovascular - Adult  Goal: Maintains optimal cardiac output and hemodynamic stability  Outcome: Progressing  Flowsheets (Taken 6/13/2024 0001)  Maintains optimal cardiac output and hemodynamic stability:   Monitor blood pressure and heart rate   Monitor urine output and notify Licensed Independent Practitioner for values outside of normal range   Assess for signs of decreased cardiac output

## 2024-06-13 NOTE — PROGRESS NOTES
reviewed as a part of this visit.    CT Chest w/ contrast: Results for orders placed during the hospital encounter of 06/11/24    CT CHEST W CONTRAST    Narrative  EXAMINATION:  CT OF THE CHEST WITH CONTRAST 6/11/2024 10:06 am    TECHNIQUE:  CT of the chest was performed with the administration of intravenous  contrast. Multiplanar reformatted images are provided for review. Automated  exposure control, iterative reconstruction, and/or weight based adjustment of  the mA/kV was utilized to reduce the radiation dose to as low as reasonably  achievable.    COMPARISON:  Chest radiograph on the same date    HISTORY:  ORDERING SYSTEM PROVIDED HISTORY: Shortness of breath.  Concern for possible  pneumonia versus CHF  TECHNOLOGIST PROVIDED HISTORY:  Reason for exam:->Shortness of breath.  Concern for possible pneumonia versus  CHF  Additional Contrast?->None  Reason for Exam: Shortness of breath. Concern for possible pneumonia versus  CHF  Relevant Medical/Surgical History: none    FINDINGS:  Mediastinum: The heart is mildly enlarged.  The aorta and pulmonary arteries  are normal.  Normal thyroid and esophagus.  Moderate mediastinal and  bilateral hilar lymphadenopathy.  Index right paratracheal lymph node  measures 11 mm.  Tiny focus of pneumomediastinum on the left near the  pulmonary hilum.    Lungs/pleura: Secretions in the trachea and right mainstem bronchus.  More  diffuse pattern of peribronchial thickening.  There are trace bilateral  pleural effusions.  Diffuse interlobular septal thickening with  reticulonodular airspace opacities throughout both lungs.  More focal pattern  of peripheral airspace opacification in the right greater than left lower  lobe.    Upper Abdomen: The adrenal glands are not included in the field of view of  this study.    Soft Tissues/Bones: No skeletal abnormalities are appreciated within the  chest.    Impression  1. Extensive pattern of interstitial change including  translational errors.      MISTI MATHIS   [facility] Pulmonary, Sleep and Critical Care  [phone]

## 2024-06-13 NOTE — PROGRESS NOTES
Patient has angiogram scheduled tomorrow morning, consent was completed with this RN - patient A+Ox4 and states all questions have been answered by the MD. Consent was signed and placed in the chart. Patient aware to be NPO at midnight and is agreeable. Will continue with POC.     Electronically signed by Glen Ramos RN on 6/13/2024 at 5:31 PM

## 2024-06-13 NOTE — PLAN OF CARE
Problem: Discharge Planning  Goal: Discharge to home or other facility with appropriate resources  Outcome: Progressing     Problem: Pain  Goal: Verbalizes/displays adequate comfort level or baseline comfort level  6/13/2024 0911 by Glen Ramos RN  Outcome: Progressing     Problem: Safety - Adult  Goal: Free from fall injury  6/13/2024 0911 by Glen Ramos RN  Outcome: Progressing     Problem: ABCDS Injury Assessment  Goal: Absence of physical injury  Outcome: Progressing     Problem: Respiratory - Adult  Goal: Achieves optimal ventilation and oxygenation  6/13/2024 0911 by Glen Ramos RN  Outcome: Progressing     Problem: Cardiovascular - Adult  Goal: Maintains optimal cardiac output and hemodynamic stability  6/13/2024 0911 by Glen Ramos RN  Outcome: Progressing     Problem: Cardiovascular - Adult  Goal: Absence of cardiac dysrhythmias or at baseline  Outcome: Progressing     Problem: Musculoskeletal - Adult  Goal: Return mobility to safest level of function  Outcome: Progressing

## 2024-06-14 VITALS
RESPIRATION RATE: 22 BRPM | HEIGHT: 66 IN | BODY MASS INDEX: 22.85 KG/M2 | TEMPERATURE: 98.2 F | WEIGHT: 142.2 LBS | SYSTOLIC BLOOD PRESSURE: 126 MMHG | HEART RATE: 80 BPM | DIASTOLIC BLOOD PRESSURE: 81 MMHG | OXYGEN SATURATION: 94 %

## 2024-06-14 LAB
ANION GAP SERPL CALCULATED.3IONS-SCNC: 10 MMOL/L (ref 3–16)
BACTERIA SPEC RESP CULT: NORMAL
BASOPHILS # BLD: 0 K/UL (ref 0–0.2)
BASOPHILS NFR BLD: 0.3 %
BUN SERPL-MCNC: 18 MG/DL (ref 7–20)
CALCIUM SERPL-MCNC: 8.9 MG/DL (ref 8.3–10.6)
CHLORIDE SERPL-SCNC: 104 MMOL/L (ref 99–110)
CO2 SERPL-SCNC: 27 MMOL/L (ref 21–32)
CREAT SERPL-MCNC: 0.9 MG/DL (ref 0.8–1.3)
DEPRECATED RDW RBC AUTO: 14.4 % (ref 12.4–15.4)
EOSINOPHIL # BLD: 0.1 K/UL (ref 0–0.6)
EOSINOPHIL NFR BLD: 1 %
GFR SERPLBLD CREATININE-BSD FMLA CKD-EPI: >90 ML/MIN/{1.73_M2}
GLUCOSE SERPL-MCNC: 83 MG/DL (ref 70–99)
GRAM STN SPEC: NORMAL
HCT VFR BLD AUTO: 31.7 % (ref 40.5–52.5)
HGB BLD-MCNC: 11.2 G/DL (ref 13.5–17.5)
LYMPHOCYTES # BLD: 2.1 K/UL (ref 1–5.1)
LYMPHOCYTES NFR BLD: 21.2 %
MAGNESIUM SERPL-MCNC: 1.9 MG/DL (ref 1.8–2.4)
MCH RBC QN AUTO: 29.6 PG (ref 26–34)
MCHC RBC AUTO-ENTMCNC: 35.3 G/DL (ref 31–36)
MCV RBC AUTO: 83.9 FL (ref 80–100)
MONOCYTES # BLD: 1 K/UL (ref 0–1.3)
MONOCYTES NFR BLD: 9.9 %
NEUTROPHILS # BLD: 6.7 K/UL (ref 1.7–7.7)
NEUTROPHILS NFR BLD: 67.6 %
NT-PROBNP SERPL-MCNC: 2509 PG/ML (ref 0–124)
PLATELET # BLD AUTO: 404 K/UL (ref 135–450)
PMV BLD AUTO: 6.7 FL (ref 5–10.5)
POTASSIUM SERPL-SCNC: 3.7 MMOL/L (ref 3.5–5.1)
PROCALCITONIN SERPL IA-MCNC: 0.11 NG/ML (ref 0–0.15)
RBC # BLD AUTO: 3.78 M/UL (ref 4.2–5.9)
SODIUM SERPL-SCNC: 141 MMOL/L (ref 136–145)
WBC # BLD AUTO: 10 K/UL (ref 4–11)

## 2024-06-14 PROCEDURE — 36415 COLL VENOUS BLD VENIPUNCTURE: CPT

## 2024-06-14 PROCEDURE — 85025 COMPLETE CBC W/AUTO DIFF WBC: CPT

## 2024-06-14 PROCEDURE — 2580000003 HC RX 258: Performed by: HOSPITALIST

## 2024-06-14 PROCEDURE — 84145 PROCALCITONIN (PCT): CPT

## 2024-06-14 PROCEDURE — 99232 SBSQ HOSP IP/OBS MODERATE 35: CPT | Performed by: INTERNAL MEDICINE

## 2024-06-14 PROCEDURE — 99233 SBSQ HOSP IP/OBS HIGH 50: CPT | Performed by: INTERNAL MEDICINE

## 2024-06-14 PROCEDURE — 6360000002 HC RX W HCPCS: Performed by: HOSPITALIST

## 2024-06-14 PROCEDURE — 6370000000 HC RX 637 (ALT 250 FOR IP): Performed by: INTERNAL MEDICINE

## 2024-06-14 PROCEDURE — 80048 BASIC METABOLIC PNL TOTAL CA: CPT

## 2024-06-14 PROCEDURE — 6370000000 HC RX 637 (ALT 250 FOR IP): Performed by: HOSPITALIST

## 2024-06-14 PROCEDURE — 83735 ASSAY OF MAGNESIUM: CPT

## 2024-06-14 PROCEDURE — 94760 N-INVAS EAR/PLS OXIMETRY 1: CPT

## 2024-06-14 PROCEDURE — 83880 ASSAY OF NATRIURETIC PEPTIDE: CPT

## 2024-06-14 RX ORDER — METOPROLOL SUCCINATE 50 MG/1
50 TABLET, EXTENDED RELEASE ORAL DAILY
Status: DISCONTINUED | OUTPATIENT
Start: 2024-06-14 | End: 2024-06-14 | Stop reason: HOSPADM

## 2024-06-14 RX ORDER — LISINOPRIL 20 MG/1
20 TABLET ORAL DAILY
Status: DISCONTINUED | OUTPATIENT
Start: 2024-06-15 | End: 2024-06-14 | Stop reason: HOSPADM

## 2024-06-14 RX ORDER — DIPHENHYDRAMINE HYDROCHLORIDE 50 MG/ML
50 INJECTION INTRAMUSCULAR; INTRAVENOUS
Status: DISCONTINUED | OUTPATIENT
Start: 2024-06-14 | End: 2024-06-14 | Stop reason: HOSPADM

## 2024-06-14 RX ORDER — SODIUM CHLORIDE 0.9 % (FLUSH) 0.9 %
5-40 SYRINGE (ML) INJECTION PRN
Status: DISCONTINUED | OUTPATIENT
Start: 2024-06-14 | End: 2024-06-14 | Stop reason: HOSPADM

## 2024-06-14 RX ORDER — SODIUM CHLORIDE 0.9 % (FLUSH) 0.9 %
5-40 SYRINGE (ML) INJECTION EVERY 12 HOURS SCHEDULED
Status: DISCONTINUED | OUTPATIENT
Start: 2024-06-14 | End: 2024-06-14 | Stop reason: HOSPADM

## 2024-06-14 RX ORDER — TORSEMIDE 20 MG/1
20 TABLET ORAL DAILY
Status: DISCONTINUED | OUTPATIENT
Start: 2024-06-15 | End: 2024-06-14 | Stop reason: HOSPADM

## 2024-06-14 RX ORDER — TORSEMIDE 20 MG/1
20 TABLET ORAL DAILY
Qty: 30 TABLET | Refills: 3 | Status: SHIPPED | OUTPATIENT
Start: 2024-06-15

## 2024-06-14 RX ORDER — METOPROLOL SUCCINATE 50 MG/1
50 TABLET, EXTENDED RELEASE ORAL DAILY
Qty: 30 TABLET | Refills: 3 | Status: SHIPPED | OUTPATIENT
Start: 2024-06-14

## 2024-06-14 RX ORDER — ALBUTEROL SULFATE 90 UG/1
2 AEROSOL, METERED RESPIRATORY (INHALATION) 4 TIMES DAILY PRN
Qty: 18 G | Refills: 3 | Status: SHIPPED | OUTPATIENT
Start: 2024-06-14

## 2024-06-14 RX ORDER — NICOTINE 21 MG/24HR
1 PATCH, TRANSDERMAL 24 HOURS TRANSDERMAL DAILY
Qty: 30 PATCH | Refills: 3 | Status: SHIPPED | OUTPATIENT
Start: 2024-06-15

## 2024-06-14 RX ORDER — SODIUM CHLORIDE 9 MG/ML
INJECTION, SOLUTION INTRAVENOUS PRN
Status: DISCONTINUED | OUTPATIENT
Start: 2024-06-14 | End: 2024-06-14 | Stop reason: HOSPADM

## 2024-06-14 RX ORDER — BUDESONIDE AND FORMOTEROL FUMARATE DIHYDRATE 160; 4.5 UG/1; UG/1
2 AEROSOL RESPIRATORY (INHALATION) 2 TIMES DAILY
Qty: 10.2 G | Refills: 3 | Status: SHIPPED | OUTPATIENT
Start: 2024-06-14

## 2024-06-14 RX ORDER — LISINOPRIL 20 MG/1
20 TABLET ORAL DAILY
Qty: 30 TABLET | Refills: 3 | Status: SHIPPED | OUTPATIENT
Start: 2024-06-15

## 2024-06-14 RX ORDER — SPIRONOLACTONE 25 MG/1
25 TABLET ORAL DAILY
Qty: 30 TABLET | Refills: 3 | Status: SHIPPED | OUTPATIENT
Start: 2024-06-15

## 2024-06-14 RX ADMIN — SODIUM CHLORIDE, PRESERVATIVE FREE 10 ML: 5 INJECTION INTRAVENOUS at 08:11

## 2024-06-14 RX ADMIN — SPIRONOLACTONE 25 MG: 25 TABLET ORAL at 08:12

## 2024-06-14 RX ADMIN — WATER 1000 MG: 1 INJECTION INTRAMUSCULAR; INTRAVENOUS; SUBCUTANEOUS at 08:11

## 2024-06-14 RX ADMIN — LISINOPRIL 10 MG: 10 TABLET ORAL at 08:11

## 2024-06-14 RX ADMIN — HYDRALAZINE HYDROCHLORIDE 10 MG: 20 INJECTION INTRAMUSCULAR; INTRAVENOUS at 00:26

## 2024-06-14 RX ADMIN — CARVEDILOL 25 MG: 25 TABLET, FILM COATED ORAL at 08:12

## 2024-06-14 RX ADMIN — ACETAMINOPHEN 325MG 650 MG: 325 TABLET ORAL at 05:47

## 2024-06-14 RX ADMIN — PREDNISONE 20 MG: 20 TABLET ORAL at 08:11

## 2024-06-14 RX ADMIN — EMPAGLIFLOZIN 10 MG: 10 TABLET, FILM COATED ORAL at 08:11

## 2024-06-14 RX ADMIN — FUROSEMIDE 40 MG: 10 INJECTION, SOLUTION INTRAMUSCULAR; INTRAVENOUS at 08:11

## 2024-06-14 ASSESSMENT — PAIN DESCRIPTION - ONSET: ONSET: PROGRESSIVE

## 2024-06-14 ASSESSMENT — PAIN SCALES - GENERAL
PAINLEVEL_OUTOF10: 10
PAINLEVEL_OUTOF10: 0

## 2024-06-14 ASSESSMENT — PAIN DESCRIPTION - LOCATION: LOCATION: HEAD

## 2024-06-14 ASSESSMENT — PAIN DESCRIPTION - PAIN TYPE: TYPE: ACUTE PAIN

## 2024-06-14 ASSESSMENT — PAIN SCALES - WONG BAKER: WONGBAKER_NUMERICALRESPONSE: NO HURT

## 2024-06-14 ASSESSMENT — PAIN - FUNCTIONAL ASSESSMENT: PAIN_FUNCTIONAL_ASSESSMENT: PREVENTS OR INTERFERES SOME ACTIVE ACTIVITIES AND ADLS

## 2024-06-14 ASSESSMENT — PAIN DESCRIPTION - FREQUENCY: FREQUENCY: CONTINUOUS

## 2024-06-14 ASSESSMENT — PAIN DESCRIPTION - ORIENTATION: ORIENTATION: LEFT;ANTERIOR

## 2024-06-14 ASSESSMENT — PAIN DESCRIPTION - DESCRIPTORS: DESCRIPTORS: ACHING

## 2024-06-14 NOTE — PROGRESS NOTES
Pt came down to the cath lab pre/post for a left heart catheterization.  Upon arriving RN explained the procedure and access site R wrist/R groin and patient very rudely and loudly said he will not have the procedure done if we have to go through his groin.  RN then explained that we needed the pt to sign the consent for the procedure and pt stated that he already signed consent upstairs.  RN looked through the chart and was unable to find consent, so RN asked patient to sign another one and patient starting yelling at RN and refused because he had already signed one.  RN then called upstairs to ask the nurse if there was a signed consent for the procedure and RN could not find one.  When we explained to the patient that they do not have a consent signed upstairs either patient became very loud and verbal.  RN then went and told charge nurse that patient was refusing to have a groin access if necessary and that patient would not sign consent for the procedure.  Charge RN called MD and MD cancelled procedure due patient not cooperating with nursing staff

## 2024-06-14 NOTE — NURSE NAVIGATOR
Discharge order noted. Pt has a follow up appointment scheduled with  Cardiology/ Dr. Blunt 6/20/24 @ 1000am  D/c wt 142lbs  standing

## 2024-06-14 NOTE — CONSULTS
Social work consult noted. Patient had questions re: medications and medical records. Patient education and information provided, as appropriate. Patient/family interested in speaking with patient advocate. Call placed to patient advocate number and request communicated to Madhavi Head, PCU Nurse Manager, as well.     Electronically signed by IBRAHIMA HUGHES RN on 6/14/2024 at 3:09 PM

## 2024-06-14 NOTE — CONSULTS
CHF Nutrition Education    Consult received for heart failure diet education.  Education deferred due to continued agitation. Heart failure diet education materials from nutrition care manual put in pt's discharge instructions. Will continue to follow pt per nutrition standards of care.       Electronically signed by Naun Coleman RD on 6/14/2024 at 2:24 PM

## 2024-06-14 NOTE — DISCHARGE INSTR - DIET
Good nutrition is important when healing from an illness, injury, or surgery.  Follow any nutrition recommendations given to you during your hospital stay.   If you were given an oral nutrition supplement while in the hospital, continue to take this supplement at home.  You can take it with meals, in-between meals, and/or before bedtime. These supplements can be purchased at most local grocery stores, pharmacies, and chain super-stores.   If you have any questions about your diet or nutrition, call the hospital and ask for the dietitian.    Sodium  Salt (sodium) makes your body hold water. You may feel shortness of breath and experience swelling when your body is holding water. You can help to prevent these symptoms by eating less salt. You may need to limit the sodium you get from food and drinks to less than 2,300 milligrams per day.  Read the nutrition label to find out how much sodium is in 1 serving of a food. Select foods with 140 milligrams of sodium or less per serving.  Foods with more than 300 milligrams of sodium per serving may not fit into a reduced-sodium meal plan depending on your other food selections. Your RDN can help you determine which foods fit into your eating plan.    Check serving sizes. If you eat more than 1 serving, you will get more sodium than the amount listed.  Tips for Limiting Sodium in Your Diet  How to limit sodium Strategies   Avoid processed foods    Choose fresh and frozen fruits and vegetables without added juices or sauces. These foods are naturally low in sodium.     Choose fresh meats. These foods are lower in sodium than processed meats, such as loyd, sausage, and hot dogs. Read the nutrition label to help you find fresh meat that is low in sodium.   Use less salt at the table and when cooking Leave the salt out of recipes for pasta, casseroles, and soups. Just 1 teaspoon of table salt has 2,300 milligrams of sodium.  Ask your RDN how to cook your favorite recipes without  rice  Unsalted popcorn, pretzels, and crackers Breads or crackers topped with salt  Cereals (hot/cold) with more than 300 milligrams sodium per serving  Biscuits, cornbread, and other “quick” breads prepared with baking soda  Prepackaged bread crumbs  Pre-seasoned rice and noodles  Self-rising flours  Salted pretzels and popcorn   Protein Foods Fresh meats, poultry, and fish  Eggs or egg beaters (if less than 200 mg per serving)    Turkey loyd (if not packaged in a sodium solution)  Canned or packed tuna (no more than 4 ounces at 1 serving)  Unsalted beans, lentils, or peas  Soy foods such as tofu, tempeh, or unsalted soy nuts  Unsalted nuts or nut butter Cured meats: loyd, ham, sausage, pepperoni, salt pork, and hot dogs  Canned meats: chili, Maggy sausage, sardines, and ham  Smoked fish and meats   Dairy and Dairy Alternatives Milk or milk powder  Fortified soy milk  Yogurt, including Greek and soy yogurt  Small amounts of natural reduced-sodium cheese (Swiss, ricotta, and fresh mozzarella are lower in sodium than others)  Cream cheese  Low-sodium cottage cheese Buttermilk  Processed cheeses   Cottage cheese (unless a low-sodium variety)  Feta cheese; shredded cheese (has more sodium than block cheese); singles slices and string cheese   Vegetables Fresh and frozen vegetables without added sauces, salt, or sodium  Low-sodium or sodium-free canned vegetables Canned vegetables that are high in sodium  Frozen vegetables with seasoning and sauces  Sauerkraut, pickles, and other pickled vegetables such as kimchi  French fries and onion rings that contain sodium   Fruit Fresh, canned, or dried fruits    Dried fruits preserved with sodium-containing additives   Fats and Oils Unsaturated vegetable oils, including olive, peanut, and canola oils  Vegetable oil-based margarines and spreads  Low sodium salad dressing and mayonnaise made from unsaturated vegetable oils  Simple salad dressings (vinegar and oil)  Unsalted

## 2024-06-14 NOTE — PROGRESS NOTES
[facility]  Cardiology Consult Note        CC:      Shortness of breath             HPI:   This is a 63 y.o. -American gentleman who is a heavy smoker but has never been sick is like came to the hospital with shortness of breath and respiratory difficulties.  He was found to be in congestive heart failure with a x-ray showing venous congestion and proBNP greater than 3000.  He denies any chest pain to me.  Troponins are negative.  The patient has been treated with IV Lasix with good diuresis.  The breathing has improved.  An echocardiogram shows an EF of 40 to 45%.  EKG showed no ischemic ST changes    Interval history  Was confrontational/argumentative with the nursing staff in the Cath Lab  They decided to cancel the procedure and send the patient back up  The patient did not want his groin prepped    Breathing is improved  No chest pain       Social History     Tobacco Use    Smoking status: Every Day     Current packs/day: 0.50     Types: Cigarettes, Cigars    Smokeless tobacco: Never   Substance Use Topics    Alcohol use: Yes     Comment: few times a year    Drug use: Yes     Types: Marijuana (Weed)     Comment: 5/20/22 marijuana      Allergies   Allergen Reactions    Penicillins Hives      metoprolol succinate  50 mg Oral Daily    [START ON 6/15/2024] lisinopril  20 mg Oral Daily    furosemide  40 mg IntraVENous BID    spironolactone  25 mg Oral Daily    empagliflozin  10 mg Oral Daily    sodium chloride flush  5-40 mL IntraVENous 2 times per day    enoxaparin  40 mg SubCUTAneous Daily    nicotine  1 patch TransDERmal Daily       Review of Systems -   Constitutional: Negative for weight gain/loss; malaise, fever  Respiratory: Negative for Asthma;  cough and hemoptysis  Cardiovascular: Negative for palpitations,dizziness   Gastrointestinal: Negative for abd.pain; constipation/diarrhea;    Genitourinary: Negative for stones; hematuria; frequency hesitancy  Integumentt: Negative for rash or  pruritis  Hematologic/lymphatic: Negative for blood dyscrasia; leukemia/lymphoma  Musculoskeletal: Negative for Connective tissue disease  Neurological:  Negative for Seizure   Behavioral/Psych:Negative for Bipolar disorder, Schizophrenia; Dementia  Endocrine: negative for thyroid, parathyroid disease      Intake/Output Summary (Last 24 hours) at 6/14/2024 1303  Last data filed at 6/14/2024 0950  Gross per 24 hour   Intake 720 ml   Output 4675 ml   Net -3955 ml       Physical Examination:    /81   Pulse 80   Temp 98.2 °F (36.8 °C) (Axillary)   Resp 22   Ht 1.676 m (5' 6\")   Wt 64.5 kg (142 lb 3.2 oz)   SpO2 94%   BMI 22.95 kg/m²    HEENT:  Face: Atraumatic, Conjunctiva: Pink; non icteric,  Mucous Memb:  Moist, No thyromegaly or Lymphadenopathy  Respiratory:  Resp Assessment: normal, Resp Auscultation: clear   Cardiovascular:  Auscultation: nl S1 & S2, Palpation:  Nl PMI; No heaves or thrills, JVP:  normal  Abdomen: Soft, non-tender, Normal bowel sounds,  No organomegaly  Extremities: No Cyanosis or Clubbing; Edema none  Neurological: Oriented to time, place, and person, Non-anxious  Psychiatric: Normal mood and affect  Skin: Warm and dry,  No rash seen      Current Facility-Administered Medications: metoprolol succinate (TOPROL XL) extended release tablet 50 mg, 50 mg, Oral, Daily  [START ON 6/15/2024] lisinopril (PRINIVIL;ZESTRIL) tablet 20 mg, 20 mg, Oral, Daily  furosemide (LASIX) injection 40 mg, 40 mg, IntraVENous, BID  spironolactone (ALDACTONE) tablet 25 mg, 25 mg, Oral, Daily  empagliflozin (JARDIANCE) tablet 10 mg, 10 mg, Oral, Daily  ipratropium 0.5 mg-albuterol 2.5 mg (DUONEB) nebulizer solution 1 Dose, 1 Dose, Inhalation, Q4H PRN  hydrALAZINE (APRESOLINE) injection 10 mg, 10 mg, IntraVENous, Q4H PRN  sodium chloride flush 0.9 % injection 5-40 mL, 5-40 mL, IntraVENous, 2 times per day  sodium chloride flush 0.9 % injection 5-40 mL, 5-40 mL, IntraVENous, PRN  0.9 % sodium chloride infusion, ,

## 2024-06-14 NOTE — PROGRESS NOTES
Pts IV out, d/c instructions went over, no questions at this time.  Pt has home meds from [facility]p pharmacy in hand. Pt awaiting ride home with belongings from friend later this evening.

## 2024-06-14 NOTE — PLAN OF CARE
Problem: Pain  Goal: Verbalizes/displays adequate comfort level or baseline comfort level  6/14/2024 0956 by Spring Espinoza, RN  Outcome: Progressing  6/14/2024 0524 by Hallie Silva, RN  Outcome: Progressing     Problem: Safety - Adult  Goal: Free from fall injury  6/14/2024 0524 by Hallie Silva, RN  Outcome: Progressing

## 2024-06-14 NOTE — PLAN OF CARE
Problem: Discharge Planning  Goal: Discharge to home or other facility with appropriate resources  Outcome: Progressing     Problem: Pain  Goal: Verbalizes/displays adequate comfort level or baseline comfort level  Outcome: Progressing     Problem: Safety - Adult  Goal: Free from fall injury  Outcome: Progressing     Problem: ABCDS Injury Assessment  Goal: Absence of physical injury  Outcome: Progressing     Problem: Respiratory - Adult  Goal: Achieves optimal ventilation and oxygenation  Outcome: Progressing     Problem: Cardiovascular - Adult  Goal: Maintains optimal cardiac output and hemodynamic stability  Outcome: Progressing     Problem: Cardiovascular - Adult  Goal: Absence of cardiac dysrhythmias or at baseline  Outcome: Progressing     Problem: Musculoskeletal - Adult  Goal: Return mobility to safest level of function  Outcome: Progressing     Problem: Musculoskeletal - Adult  Goal: Maintain proper alignment of affected body part  Outcome: Progressing     Problem: Musculoskeletal - Adult  Goal: Return ADL status to a safe level of function  Outcome: Progressing     Problem: Metabolic/Fluid and Electrolytes - Adult  Goal: Electrolytes maintained within normal limits  Outcome: Progressing     Problem: Metabolic/Fluid and Electrolytes - Adult  Goal: Hemodynamic stability and optimal renal function maintained  Outcome: Progressing     Problem: Metabolic/Fluid and Electrolytes - Adult  Goal: Glucose maintained within prescribed range  Outcome: Progressing     Problem: Hematologic - Adult  Goal: Maintains hematologic stability  Outcome: Progressing

## 2024-06-14 NOTE — DISCHARGE SUMMARY
V2.0  Discharge Summary    Name:  Tate Adams /Age/Sex: 1960 (63 y.o. male)   Admit Date: 2024  Discharge Date: 24    MRN & CSN:  [id number] & [id number] Encounter Date and Time 24 2:40 PM EDT    Attending:  Orion Perry MD Discharging Provider: Orion Perry MD       Hospital Course:     Brief HPI: Tate Adams is a 63 y.o. male with tobacco use disorder and no known medical history presented emergency room with progressive shortness of breath, leg swelling and productive cough...  The patient started having cough productive of yellow/orange sputum 7 days ago..  Over the past 2 days he is experienced progressive shortness of breath and bilateral ankle swelling..  Shortness of breath is worse with exertion, no orthopnea..  No fevers, chills or rigors.  No chest pain  He smokes 1.5 packs/day, drinks alcohol occasionally, denies illicit drug use..  He does not have a  PCP and is not on any medications  In the ED, BP was 206/100, pulse 84, respirations 36, temperature 98.5  He was hypoxic with oxygen saturation down to 85% on room air and placed on 5 L nasal cannula  BNP was 3000  CT chest showed  1. Extensive pattern of interstitial change including peribronchial  thickening, reticulonodular opacities as well as peripheral airspace  opacities in both lower lobes.  Pattern is nonspecific but may represent an  atypical or viral pattern of pneumonia.  Pulmonary edema may also be  considered.  2. Mediastinal and bilateral hilar lymphadenopathy is likely reactive.  3. Trace pneumomediastinum on the left.  Based upon appearance and location,  this is likely spontaneous relating to altered intrathoracic pressure  dissecting from bronchovascular sheath in the left hilum.    Brief Problem Based Course:     -Acute respiratory failure hypoxia-oxygen saturation 85% on room air, required up to 5 L nasal cannula--weaned to room air  but now back on 3 L nasal cannula  -Abnormal imaging  diffuse increased interstitial markings bilaterally reflecting pulmonary vascular congestion/interstitial edema versus an atypical pneumonia.     CT CHEST W CONTRAST    Result Date: 6/11/2024  EXAMINATION: CT OF THE CHEST WITH CONTRAST 6/11/2024 10:06 am TECHNIQUE: CT of the chest was performed with the administration of intravenous contrast. Multiplanar reformatted images are provided for review. Automated exposure control, iterative reconstruction, and/or weight based adjustment of the mA/kV was utilized to reduce the radiation dose to as low as reasonably achievable. COMPARISON: Chest radiograph on the same date HISTORY: ORDERING SYSTEM PROVIDED HISTORY: Shortness of breath.  Concern for possible pneumonia versus CHF TECHNOLOGIST PROVIDED HISTORY: Reason for exam:->Shortness of breath.  Concern for possible pneumonia versus CHF Additional Contrast?->None Reason for Exam: Shortness of breath. Concern for possible pneumonia versus CHF Relevant Medical/Surgical History: none FINDINGS: Mediastinum: The heart is mildly enlarged.  The aorta and pulmonary arteries are normal.  Normal thyroid and esophagus.  Moderate mediastinal and bilateral hilar lymphadenopathy.  Index right paratracheal lymph node measures 11 mm.  Tiny focus of pneumomediastinum on the left near the pulmonary hilum. Lungs/pleura: Secretions in the trachea and right mainstem bronchus.  More diffuse pattern of peribronchial thickening.  There are trace bilateral pleural effusions.  Diffuse interlobular septal thickening with reticulonodular airspace opacities throughout both lungs.  More focal pattern of peripheral airspace opacification in the right greater than left lower lobe. Upper Abdomen: The adrenal glands are not included in the field of view of this study. Soft Tissues/Bones: No skeletal abnormalities are appreciated within the chest.     1. Extensive pattern of interstitial change including peribronchial thickening, reticulonodular opacities

## 2024-06-15 LAB
A1AT PHENOTYP SERPL-IMP: ABNORMAL
A1AT SERPL-MCNC: 207 MG/DL (ref 90–200)

## 2024-06-17 ENCOUNTER — FOLLOWUP TELEPHONE ENCOUNTER (OUTPATIENT)
Dept: ADMINISTRATIVE | Age: 64
End: 2024-06-17

## 2024-06-17 NOTE — PROGRESS NOTES
HF RN made x3 attempts throughout today to reach pt for a 72 hour heart failure hospital follow up call. I was unable to reach pt and unable to leave a message as well. Pt does have a hospital follow up appt scheduled for 6/20 with Dr Blunt which was on his AVS at time of discharge.   
none

## 2024-06-22 NOTE — PROGRESS NOTES
Physician Progress Note      PATIENT:               AKOSUA MAHONEY  CSN #:                  [id number]  :                       1960  ADMIT DATE:       2024 8:14 AM  DISCH DATE:        2024 6:25 PM  RESPONDING  PROVIDER #:        Orion Droado MD          QUERY TEXT:    Pt admitted with acute respiratory failure hypoxia and has CHF documented. If   possible, please document in progress notes and discharge summary further   specificity regarding the type and acuity of CHF:    The medical record reflects the following:  Risk Factors: Hypertensive urgency/emergency  Clinical Indicators: proBNP 3,044. Echo on : Left?Ventricle: Reduced left   ventricular systolic function with a visually estimated EF of 40 - 45%. Left   ventricle size is normal. Normal wall thickness. Mild global hypokinesis   present. Grade II diastolic dysfunction with increased LAP.  Treatment: Coreg, IV Lasix, Lisinopril, Cozaar, Aldactone, weights, I&O's,   cardiology consult    RADHA Fregoso, RN, CRCR  Clinical   [phone]  Options provided:  -- Acute Systolic and Diastolic CHF  -- Acute Systolic CHF/HFrEF  -- Acute Diastolic CHF/HFpEF  -- Other - I will add my own diagnosis  -- Disagree - Not applicable / Not valid  -- Disagree - Clinically unable to determine / Unknown  -- Refer to Clinical Documentation Reviewer    PROVIDER RESPONSE TEXT:    Provider disagreed with this query.  type of heart failure is already documented    Query created by: Marisela Fam on 2024 2:07 PM      Electronically signed by:  Orion Dorado MD 2024 4:25 PM

## 2024-07-02 ENCOUNTER — TELEPHONE (OUTPATIENT)
Dept: PRIMARY CARE CLINIC | Age: 64
End: 2024-07-02